# Patient Record
Sex: FEMALE | Race: ASIAN | NOT HISPANIC OR LATINO | URBAN - METROPOLITAN AREA
[De-identification: names, ages, dates, MRNs, and addresses within clinical notes are randomized per-mention and may not be internally consistent; named-entity substitution may affect disease eponyms.]

---

## 2020-08-28 ENCOUNTER — EMERGENCY (EMERGENCY)
Facility: HOSPITAL | Age: 60
LOS: 1 days | Discharge: HOME | End: 2020-08-28
Attending: EMERGENCY MEDICINE
Payer: MEDICAID

## 2020-08-28 VITALS
HEIGHT: 61.42 IN | HEART RATE: 72 BPM | RESPIRATION RATE: 18 BRPM | OXYGEN SATURATION: 97 % | SYSTOLIC BLOOD PRESSURE: 127 MMHG | WEIGHT: 143.3 LBS | TEMPERATURE: 98 F | DIASTOLIC BLOOD PRESSURE: 87 MMHG

## 2020-08-28 VITALS
SYSTOLIC BLOOD PRESSURE: 122 MMHG | TEMPERATURE: 97 F | DIASTOLIC BLOOD PRESSURE: 76 MMHG | OXYGEN SATURATION: 100 % | HEART RATE: 62 BPM | RESPIRATION RATE: 18 BRPM

## 2020-08-28 LAB
ALBUMIN SERPL ELPH-MCNC: 3.8 G/DL — SIGNIFICANT CHANGE UP (ref 3.5–5)
ALP SERPL-CCNC: 114 U/L — SIGNIFICANT CHANGE UP (ref 40–120)
ALT FLD-CCNC: 49 U/L DA — SIGNIFICANT CHANGE UP (ref 10–60)
ANION GAP SERPL CALC-SCNC: 6 MMOL/L — SIGNIFICANT CHANGE UP (ref 5–17)
APTT BLD: 33 SEC — SIGNIFICANT CHANGE UP (ref 27.5–35.5)
AST SERPL-CCNC: 29 U/L — SIGNIFICANT CHANGE UP (ref 10–40)
BASOPHILS # BLD AUTO: 0.06 K/UL — SIGNIFICANT CHANGE UP (ref 0–0.2)
BASOPHILS NFR BLD AUTO: 0.9 % — SIGNIFICANT CHANGE UP (ref 0–2)
BILIRUB SERPL-MCNC: 0.4 MG/DL — SIGNIFICANT CHANGE UP (ref 0.2–1.2)
BUN SERPL-MCNC: 16 MG/DL — SIGNIFICANT CHANGE UP (ref 7–18)
CALCIUM SERPL-MCNC: 9.4 MG/DL — SIGNIFICANT CHANGE UP (ref 8.4–10.5)
CHLORIDE SERPL-SCNC: 107 MMOL/L — SIGNIFICANT CHANGE UP (ref 96–108)
CO2 SERPL-SCNC: 26 MMOL/L — SIGNIFICANT CHANGE UP (ref 22–31)
CREAT SERPL-MCNC: 0.79 MG/DL — SIGNIFICANT CHANGE UP (ref 0.5–1.3)
EOSINOPHIL # BLD AUTO: 0.21 K/UL — SIGNIFICANT CHANGE UP (ref 0–0.5)
EOSINOPHIL NFR BLD AUTO: 3.1 % — SIGNIFICANT CHANGE UP (ref 0–6)
GLUCOSE SERPL-MCNC: 200 MG/DL — HIGH (ref 70–99)
HCT VFR BLD CALC: 46.5 % — HIGH (ref 34.5–45)
HGB BLD-MCNC: 15 G/DL — SIGNIFICANT CHANGE UP (ref 11.5–15.5)
IMM GRANULOCYTES NFR BLD AUTO: 0.6 % — SIGNIFICANT CHANGE UP (ref 0–1.5)
INR BLD: 0.96 RATIO — SIGNIFICANT CHANGE UP (ref 0.88–1.16)
LYMPHOCYTES # BLD AUTO: 2.07 K/UL — SIGNIFICANT CHANGE UP (ref 1–3.3)
LYMPHOCYTES # BLD AUTO: 30.6 % — SIGNIFICANT CHANGE UP (ref 13–44)
MCHC RBC-ENTMCNC: 27.9 PG — SIGNIFICANT CHANGE UP (ref 27–34)
MCHC RBC-ENTMCNC: 32.3 GM/DL — SIGNIFICANT CHANGE UP (ref 32–36)
MCV RBC AUTO: 86.4 FL — SIGNIFICANT CHANGE UP (ref 80–100)
MONOCYTES # BLD AUTO: 0.49 K/UL — SIGNIFICANT CHANGE UP (ref 0–0.9)
MONOCYTES NFR BLD AUTO: 7.2 % — SIGNIFICANT CHANGE UP (ref 2–14)
NEUTROPHILS # BLD AUTO: 3.89 K/UL — SIGNIFICANT CHANGE UP (ref 1.8–7.4)
NEUTROPHILS NFR BLD AUTO: 57.6 % — SIGNIFICANT CHANGE UP (ref 43–77)
NRBC # BLD: 0 /100 WBCS — SIGNIFICANT CHANGE UP (ref 0–0)
NT-PROBNP SERPL-SCNC: 34 PG/ML — SIGNIFICANT CHANGE UP (ref 0–125)
PLATELET # BLD AUTO: 275 K/UL — SIGNIFICANT CHANGE UP (ref 150–400)
POTASSIUM SERPL-MCNC: 3.7 MMOL/L — SIGNIFICANT CHANGE UP (ref 3.5–5.3)
POTASSIUM SERPL-SCNC: 3.7 MMOL/L — SIGNIFICANT CHANGE UP (ref 3.5–5.3)
PROT SERPL-MCNC: 8.3 G/DL — SIGNIFICANT CHANGE UP (ref 6–8.3)
PROTHROM AB SERPL-ACNC: 11.3 SEC — SIGNIFICANT CHANGE UP (ref 10.6–13.6)
RBC # BLD: 5.38 M/UL — HIGH (ref 3.8–5.2)
RBC # FLD: 12.7 % — SIGNIFICANT CHANGE UP (ref 10.3–14.5)
SARS-COV-2 RNA SPEC QL NAA+PROBE: SIGNIFICANT CHANGE UP
SODIUM SERPL-SCNC: 139 MMOL/L — SIGNIFICANT CHANGE UP (ref 135–145)
TROPONIN I SERPL-MCNC: <0.015 NG/ML — SIGNIFICANT CHANGE UP (ref 0–0.04)
TROPONIN I SERPL-MCNC: <0.015 NG/ML — SIGNIFICANT CHANGE UP (ref 0–0.04)
WBC # BLD: 6.76 K/UL — SIGNIFICANT CHANGE UP (ref 3.8–10.5)
WBC # FLD AUTO: 6.76 K/UL — SIGNIFICANT CHANGE UP (ref 3.8–10.5)

## 2020-08-28 PROCEDURE — 86900 BLOOD TYPING SEROLOGIC ABO: CPT

## 2020-08-28 PROCEDURE — 85610 PROTHROMBIN TIME: CPT

## 2020-08-28 PROCEDURE — 86850 RBC ANTIBODY SCREEN: CPT

## 2020-08-28 PROCEDURE — 71046 X-RAY EXAM CHEST 2 VIEWS: CPT

## 2020-08-28 PROCEDURE — 85027 COMPLETE CBC AUTOMATED: CPT

## 2020-08-28 PROCEDURE — 93005 ELECTROCARDIOGRAM TRACING: CPT

## 2020-08-28 PROCEDURE — 99284 EMERGENCY DEPT VISIT MOD MDM: CPT | Mod: 25

## 2020-08-28 PROCEDURE — U0003: CPT

## 2020-08-28 PROCEDURE — 36415 COLL VENOUS BLD VENIPUNCTURE: CPT

## 2020-08-28 PROCEDURE — 80053 COMPREHEN METABOLIC PANEL: CPT

## 2020-08-28 PROCEDURE — 86901 BLOOD TYPING SEROLOGIC RH(D): CPT

## 2020-08-28 PROCEDURE — 83880 ASSAY OF NATRIURETIC PEPTIDE: CPT

## 2020-08-28 PROCEDURE — 85730 THROMBOPLASTIN TIME PARTIAL: CPT

## 2020-08-28 PROCEDURE — 71046 X-RAY EXAM CHEST 2 VIEWS: CPT | Mod: 26

## 2020-08-28 PROCEDURE — 96372 THER/PROPH/DIAG INJ SC/IM: CPT

## 2020-08-28 PROCEDURE — 84484 ASSAY OF TROPONIN QUANT: CPT

## 2020-08-28 PROCEDURE — 99285 EMERGENCY DEPT VISIT HI MDM: CPT | Mod: 25

## 2020-08-28 RX ORDER — KETOROLAC TROMETHAMINE 30 MG/ML
30 SYRINGE (ML) INJECTION ONCE
Refills: 0 | Status: DISCONTINUED | OUTPATIENT
Start: 2020-08-28 | End: 2020-08-28

## 2020-08-28 RX ORDER — NITROGLYCERIN 6.5 MG
0.4 CAPSULE, EXTENDED RELEASE ORAL
Refills: 0 | Status: DISCONTINUED | OUTPATIENT
Start: 2020-08-28 | End: 2020-09-01

## 2020-08-28 RX ORDER — ASPIRIN/CALCIUM CARB/MAGNESIUM 324 MG
162 TABLET ORAL DAILY
Refills: 0 | Status: DISCONTINUED | OUTPATIENT
Start: 2020-08-28 | End: 2020-09-01

## 2020-08-28 RX ADMIN — Medication 0.4 MILLIGRAM(S): at 10:25

## 2020-08-28 RX ADMIN — Medication 162 MILLIGRAM(S): at 10:23

## 2020-08-28 RX ADMIN — Medication 0.4 MILLIGRAM(S): at 15:01

## 2020-08-28 RX ADMIN — Medication 30 MILLIGRAM(S): at 15:32

## 2020-08-28 RX ADMIN — Medication 30 MILLIGRAM(S): at 15:40

## 2020-08-28 NOTE — ED PROVIDER NOTE - OBJECTIVE STATEMENT
61 y/o F patient with a PMHx of HTN, DM, presents to the ED for intermittent chest pain x3 months worsening on 8/26 and today. Patient reports chest pain lasted about 30mins. States associated shortness of breath worse with exertion, diaphoresis, lightheadedness, fatigue and sweating. Denies fever, chills, cough, orthopnea or any other complaints. NKDA.

## 2020-08-28 NOTE — ED PROVIDER NOTE - PATIENT PORTAL LINK FT
You can access the FollowMyHealth Patient Portal offered by [facility] by registering at the following website: [email or web address]. By joining [facility]’s FollowMyHealth portal, you will also be able to view your health information using other applications (apps) compatible with our system.

## 2020-08-28 NOTE — ED ADULT NURSE NOTE - OBJECTIVE STATEMENT
Pt arrived to ED c/o chest pain on and off x 3 months, accompanied by shortness of breath  Appears comfortable, states pain is 4/10

## 2020-08-28 NOTE — ED ADULT NURSE NOTE - CHPI ED NUR SYMPTOMS NEG
no syncope/no nausea/no dizziness/no fever/no chills/no shortness of breath/no vomiting/no congestion/no diaphoresis/no back pain

## 2022-09-15 ENCOUNTER — INPATIENT (INPATIENT)
Facility: HOSPITAL | Age: 62
LOS: 1 days | Discharge: HOME | DRG: 247 | End: 2022-09-17
Attending: INTERNAL MEDICINE | Admitting: INTERNAL MEDICINE
Payer: MEDICAID

## 2022-09-15 ENCOUNTER — EMERGENCY (EMERGENCY)
Facility: HOSPITAL | Age: 62
LOS: 1 days | Discharge: ANOTHER HOSPITAL | End: 2022-09-15
Admitting: EMERGENCY MEDICINE
Payer: MEDICAID

## 2022-09-15 VITALS
HEIGHT: 61 IN | SYSTOLIC BLOOD PRESSURE: 132 MMHG | DIASTOLIC BLOOD PRESSURE: 86 MMHG | TEMPERATURE: 98 F | OXYGEN SATURATION: 99 % | WEIGHT: 205.25 LBS | HEART RATE: 85 BPM | RESPIRATION RATE: 18 BRPM

## 2022-09-15 DIAGNOSIS — I21.3 ST ELEVATION (STEMI) MYOCARDIAL INFARCTION OF UNSPECIFIED SITE: ICD-10-CM

## 2022-09-15 DIAGNOSIS — Z90.49 ACQUIRED ABSENCE OF OTHER SPECIFIED PARTS OF DIGESTIVE TRACT: Chronic | ICD-10-CM

## 2022-09-15 PROBLEM — E11.9 TYPE 2 DIABETES MELLITUS WITHOUT COMPLICATIONS: Chronic | Status: ACTIVE | Noted: 2020-08-28

## 2022-09-15 PROBLEM — I10 ESSENTIAL (PRIMARY) HYPERTENSION: Chronic | Status: ACTIVE | Noted: 2020-08-28

## 2022-09-15 LAB
ALBUMIN SERPL ELPH-MCNC: 4.6 G/DL — SIGNIFICANT CHANGE UP (ref 3.3–5)
ALP SERPL-CCNC: 71 U/L — SIGNIFICANT CHANGE UP (ref 40–120)
ALT FLD-CCNC: 22 U/L — SIGNIFICANT CHANGE UP (ref 10–45)
ANION GAP SERPL CALC-SCNC: 11 MMOL/L — SIGNIFICANT CHANGE UP (ref 5–17)
APTT BLD: >200 SEC — CRITICAL HIGH (ref 27.5–35.5)
AST SERPL-CCNC: 71 U/L — HIGH (ref 10–40)
BASOPHILS # BLD AUTO: 0.05 K/UL — SIGNIFICANT CHANGE UP (ref 0–0.2)
BASOPHILS NFR BLD AUTO: 0.5 % — SIGNIFICANT CHANGE UP (ref 0–2)
BILIRUB SERPL-MCNC: 0.6 MG/DL — SIGNIFICANT CHANGE UP (ref 0.2–1.2)
BLD GP AB SCN SERPL QL: NEGATIVE — SIGNIFICANT CHANGE UP
BUN SERPL-MCNC: 6 MG/DL — LOW (ref 7–23)
CALCIUM SERPL-MCNC: 9.4 MG/DL — SIGNIFICANT CHANGE UP (ref 8.4–10.5)
CHLORIDE SERPL-SCNC: 105 MMOL/L — SIGNIFICANT CHANGE UP (ref 96–108)
CK MB BLD-MCNC: 10.9 % — HIGH (ref 0–3.5)
CK MB CFR SERPL CALC: 80.5 NG/ML — HIGH (ref 0–3.8)
CK SERPL-CCNC: 740 U/L — HIGH (ref 25–170)
CO2 SERPL-SCNC: 22 MMOL/L — SIGNIFICANT CHANGE UP (ref 22–31)
CREAT SERPL-MCNC: 0.44 MG/DL — LOW (ref 0.5–1.3)
EGFR: 111 ML/MIN/1.73M2 — SIGNIFICANT CHANGE UP
EOSINOPHIL # BLD AUTO: 0.11 K/UL — SIGNIFICANT CHANGE UP (ref 0–0.5)
EOSINOPHIL NFR BLD AUTO: 1.1 % — SIGNIFICANT CHANGE UP (ref 0–6)
GLUCOSE SERPL-MCNC: 160 MG/DL — HIGH (ref 70–99)
HCT VFR BLD CALC: 43.3 % — SIGNIFICANT CHANGE UP (ref 34.5–45)
HGB BLD-MCNC: 14.2 G/DL — SIGNIFICANT CHANGE UP (ref 11.5–15.5)
IMM GRANULOCYTES NFR BLD AUTO: 0.7 % — SIGNIFICANT CHANGE UP (ref 0–0.9)
INR BLD: 1.07 RATIO — SIGNIFICANT CHANGE UP (ref 0.88–1.16)
LACTATE SERPL-SCNC: 1.7 MMOL/L — SIGNIFICANT CHANGE UP (ref 0.5–2)
LYMPHOCYTES # BLD AUTO: 1.79 K/UL — SIGNIFICANT CHANGE UP (ref 1–3.3)
LYMPHOCYTES # BLD AUTO: 17.5 % — SIGNIFICANT CHANGE UP (ref 13–44)
MAGNESIUM SERPL-MCNC: 1.9 MG/DL — SIGNIFICANT CHANGE UP (ref 1.6–2.6)
MCHC RBC-ENTMCNC: 27.8 PG — SIGNIFICANT CHANGE UP (ref 27–34)
MCHC RBC-ENTMCNC: 32.8 GM/DL — SIGNIFICANT CHANGE UP (ref 32–36)
MCV RBC AUTO: 84.9 FL — SIGNIFICANT CHANGE UP (ref 80–100)
MONOCYTES # BLD AUTO: 0.54 K/UL — SIGNIFICANT CHANGE UP (ref 0–0.9)
MONOCYTES NFR BLD AUTO: 5.3 % — SIGNIFICANT CHANGE UP (ref 2–14)
NEUTROPHILS # BLD AUTO: 7.64 K/UL — HIGH (ref 1.8–7.4)
NEUTROPHILS NFR BLD AUTO: 74.9 % — SIGNIFICANT CHANGE UP (ref 43–77)
NRBC # BLD: 0 /100 WBCS — SIGNIFICANT CHANGE UP (ref 0–0)
NT-PROBNP SERPL-SCNC: 104 PG/ML — SIGNIFICANT CHANGE UP (ref 0–300)
PHOSPHATE SERPL-MCNC: 2.4 MG/DL — LOW (ref 2.5–4.5)
PLATELET # BLD AUTO: 278 K/UL — SIGNIFICANT CHANGE UP (ref 150–400)
POTASSIUM SERPL-MCNC: 3.8 MMOL/L — SIGNIFICANT CHANGE UP (ref 3.5–5.3)
POTASSIUM SERPL-SCNC: 3.8 MMOL/L — SIGNIFICANT CHANGE UP (ref 3.5–5.3)
PROT SERPL-MCNC: 7.7 G/DL — SIGNIFICANT CHANGE UP (ref 6–8.3)
PROTHROM AB SERPL-ACNC: 12.4 SEC — SIGNIFICANT CHANGE UP (ref 10.5–13.4)
RBC # BLD: 5.1 M/UL — SIGNIFICANT CHANGE UP (ref 3.8–5.2)
RBC # FLD: 12.4 % — SIGNIFICANT CHANGE UP (ref 10.3–14.5)
RH IG SCN BLD-IMP: POSITIVE — SIGNIFICANT CHANGE UP
SODIUM SERPL-SCNC: 138 MMOL/L — SIGNIFICANT CHANGE UP (ref 135–145)
TROPONIN T, HIGH SENSITIVITY RESULT: 1103 NG/L — HIGH (ref 0–51)
WBC # BLD: 10.2 K/UL — SIGNIFICANT CHANGE UP (ref 3.8–10.5)
WBC # FLD AUTO: 10.2 K/UL — SIGNIFICANT CHANGE UP (ref 3.8–10.5)

## 2022-09-15 PROCEDURE — 99152 MOD SED SAME PHYS/QHP 5/>YRS: CPT

## 2022-09-15 PROCEDURE — 93458 L HRT ARTERY/VENTRICLE ANGIO: CPT | Mod: 26,59

## 2022-09-15 PROCEDURE — ZZZZZ: CPT

## 2022-09-15 PROCEDURE — 92941 PRQ TRLML REVSC TOT OCCL AMI: CPT | Mod: LD

## 2022-09-15 PROCEDURE — 99291 CRITICAL CARE FIRST HOUR: CPT | Mod: GC,25

## 2022-09-15 RX ORDER — INFLUENZA VIRUS VACCINE 15; 15; 15; 15 UG/.5ML; UG/.5ML; UG/.5ML; UG/.5ML
0.5 SUSPENSION INTRAMUSCULAR ONCE
Refills: 0 | Status: DISCONTINUED | OUTPATIENT
Start: 2022-09-15 | End: 2022-09-17

## 2022-09-15 RX ORDER — DEXTROSE 50 % IN WATER 50 %
12.5 SYRINGE (ML) INTRAVENOUS ONCE
Refills: 0 | Status: DISCONTINUED | OUTPATIENT
Start: 2022-09-15 | End: 2022-09-17

## 2022-09-15 RX ORDER — POTASSIUM CHLORIDE 20 MEQ
20 PACKET (EA) ORAL ONCE
Refills: 0 | Status: COMPLETED | OUTPATIENT
Start: 2022-09-15 | End: 2022-09-15

## 2022-09-15 RX ORDER — GLUCAGON INJECTION, SOLUTION 0.5 MG/.1ML
1 INJECTION, SOLUTION SUBCUTANEOUS ONCE
Refills: 0 | Status: DISCONTINUED | OUTPATIENT
Start: 2022-09-15 | End: 2022-09-17

## 2022-09-15 RX ORDER — TICAGRELOR 90 MG/1
90 TABLET ORAL EVERY 12 HOURS
Refills: 0 | Status: DISCONTINUED | OUTPATIENT
Start: 2022-09-16 | End: 2022-09-17

## 2022-09-15 RX ORDER — INSULIN LISPRO 100/ML
VIAL (ML) SUBCUTANEOUS
Refills: 0 | Status: DISCONTINUED | OUTPATIENT
Start: 2022-09-15 | End: 2022-09-17

## 2022-09-15 RX ORDER — ASPIRIN/CALCIUM CARB/MAGNESIUM 324 MG
81 TABLET ORAL DAILY
Refills: 0 | Status: DISCONTINUED | OUTPATIENT
Start: 2022-09-16 | End: 2022-09-17

## 2022-09-15 RX ORDER — DEXTROSE 50 % IN WATER 50 %
25 SYRINGE (ML) INTRAVENOUS ONCE
Refills: 0 | Status: DISCONTINUED | OUTPATIENT
Start: 2022-09-15 | End: 2022-09-17

## 2022-09-15 RX ORDER — HEPARIN SODIUM 5000 [USP'U]/ML
5000 INJECTION INTRAVENOUS; SUBCUTANEOUS EVERY 12 HOURS
Refills: 0 | Status: DISCONTINUED | OUTPATIENT
Start: 2022-09-15 | End: 2022-09-17

## 2022-09-15 RX ORDER — SODIUM CHLORIDE 9 MG/ML
1000 INJECTION, SOLUTION INTRAVENOUS
Refills: 0 | Status: DISCONTINUED | OUTPATIENT
Start: 2022-09-15 | End: 2022-09-17

## 2022-09-15 RX ORDER — ATORVASTATIN CALCIUM 80 MG/1
80 TABLET, FILM COATED ORAL AT BEDTIME
Refills: 0 | Status: DISCONTINUED | OUTPATIENT
Start: 2022-09-15 | End: 2022-09-17

## 2022-09-15 RX ORDER — CHLORHEXIDINE GLUCONATE 213 G/1000ML
1 SOLUTION TOPICAL DAILY
Refills: 0 | Status: DISCONTINUED | OUTPATIENT
Start: 2022-09-15 | End: 2022-09-17

## 2022-09-15 RX ORDER — DEXTROSE 50 % IN WATER 50 %
15 SYRINGE (ML) INTRAVENOUS ONCE
Refills: 0 | Status: DISCONTINUED | OUTPATIENT
Start: 2022-09-15 | End: 2022-09-17

## 2022-09-15 RX ORDER — MAGNESIUM SULFATE 500 MG/ML
2 VIAL (ML) INJECTION ONCE
Refills: 0 | Status: COMPLETED | OUTPATIENT
Start: 2022-09-15 | End: 2022-09-15

## 2022-09-15 RX ORDER — METOPROLOL TARTRATE 50 MG
25 TABLET ORAL
Refills: 0 | Status: DISCONTINUED | OUTPATIENT
Start: 2022-09-15 | End: 2022-09-17

## 2022-09-15 RX ADMIN — Medication 20 MILLIEQUIVALENT(S): at 22:29

## 2022-09-15 RX ADMIN — ATORVASTATIN CALCIUM 80 MILLIGRAM(S): 80 TABLET, FILM COATED ORAL at 21:15

## 2022-09-15 RX ADMIN — Medication 25 GRAM(S): at 22:29

## 2022-09-15 RX ADMIN — Medication 25 MILLIGRAM(S): at 22:29

## 2022-09-15 NOTE — PATIENT PROFILE ADULT - CAREGIVER RELATION TO PATIENT
Medicare Annual Wellness Visit  Chief Complaint   Patient presents with   • Medicare Wellness-subsequent   • Hypertension   • Heartburn   • Overactive Bladder   • Hypothyroidism   • Constipation       Subjective     Anila Spencer is a 84 y.o. female who presents for an Annual Wellness Visit. In addition, we addressed the following health issues:    Patient is following up on multiple chronic medical conditions, including hypertension, hypothyroidism, prediabetes, GERD, constipation, and anxiety.  See review of systems below for more details.      Medications    Current Outpatient Medications:   •  Calcium Carbonate-Vitamin D3 (CALCIUM 600-D) 600-400 MG-UNIT tablet, Take 1 tablet by mouth 2 (Two) Times a Day., Disp: , Rfl:   •  Dexilant 60 MG capsule, TAKE 1 CAPSULE BY MOUTH EVERY DAY, Disp: 90 capsule, Rfl: 0  •  escitalopram (LEXAPRO) 5 MG tablet, Take 5 mg by mouth Daily., Disp: , Rfl:   •  fesoterodine fumarate (TOVIAZ ER) 8 MG tablet sustained-release 24 hour tablet, Take 8 mg by mouth Every Evening., Disp: , Rfl:   •  levothyroxine (SYNTHROID, LEVOTHROID) 50 MCG tablet, TAKE 1 TABLET BY MOUTH EVERY DAY, Disp: 90 tablet, Rfl: 0  •  losartan (COZAAR) 25 MG tablet, TAKE 1 TABLET BY MOUTH EVERY DAY FOR BLOOD PRESSURE, Disp: 90 tablet, Rfl: 3  •  metoprolol tartrate (LOPRESSOR) 50 MG tablet, TAKE 1 TABLET BY MOUTH TWICE A DAY, Disp: 180 tablet, Rfl: 0  •  midodrine (PROAMATINE) 5 MG tablet, TAKE 1 TABLET BY MOUTH EVERY DAY, Disp: 90 tablet, Rfl: 1  •  multivitamin with minerals tablet tablet, Take 1 tablet by mouth Daily., Disp: , Rfl:   •  psyllium (METAMUCIL) 58.6 % packet, Take 1 packet by mouth Daily., Disp: , Rfl:   •  zonisamide (ZONEGRAN) 100 MG capsule, Take 300 mg by mouth Every Evening. Take three 100 mg capsules may take four if needed, Disp: , Rfl:      Problem List  Patient Active Problem List   Diagnosis   • Cardiomegaly   • Essential hypertension   • Mitral valve insufficiency   • Presence of  cardiac pacemaker   • DDD (degenerative disc disease), lumbar   • Sick sinus syndrome (CMS/HCC)   • PCO (posterior capsular opacification), bilateral   • Diplopia   • Myopia   • Glaucoma suspect   • Presbyopia   • Pseudophakia of both eyes   • Prediabetes   • Hypothyroidism   • Urinary incontinence   • Squamous cell skin cancer, face   • Spinal stenosis of cervical region   • Seizure disorder (CMS/HCC)   • Rotator cuff tear   • Osteoporosis   • Osteoarthritis   • Kidney stone   • GERD (gastroesophageal reflux disease)   • Fracture of multiple ribs   • Emphysema of lung (CMS/HCC)   • Fatty liver   • Degeneration of intervertebral disc of thoracic region   • DDD (degenerative disc disease), cervical   • Chronic constipation   • Cervical spinal stenosis   • Biliary dyskinesia   • Basal cell carcinoma (BCC) of face   • Anxiety   • Traumatic brain injury (CMS/HCC)   • Nasal fracture   • Chronic insomnia   • Allergic rhinitis   • Class 1 obesity due to excess calories with serious comorbidity and body mass index (BMI) of 32.0 to 32.9 in adult   • Left arm pain   • Glaucoma suspect, left   • Elective replacement indicated for cardiac pacemaker battery at end of lifespan   • Overactive bladder   • Orthostatic hypotension       Surgical History  Past Surgical History:   Procedure Laterality Date   • BILATERAL SALPINGO OOPHORECTOMY      for benign cysts   • BREAST CYST ASPIRATION Left    • CARDIAC CATHETERIZATION  08/25/2009    25% LAD. L Cx luminal irregularities. Normal L main   • CARDIAC CATHETERIZATION  03/29/2017    25% LAD. 10-20% LCX. ER 65%. Dr. Agrawal.    • CARDIAC ELECTROPHYSIOLOGY PROCEDURE N/A 6/3/2021    Procedure: PPM generator change - dual    MDT;  Surgeon: Bharathi Mora MD;  Location: [facility] INVASIVE LOCATION;  Service: Cardiovascular;  Laterality: N/A;   • CARDIOVASCULAR STRESS TEST  2019   • CATARACT EXTRACTION  2006   • COSMETIC SURGERY      Lip   • ECHO - CONVERTED  2019   • ENDOSCOPY  05/17/2017     [place], Dr. Gutierrez   • ENDOSCOPY N/A 8/22/2019    Procedure: ESOPHAGOGASTRODUODENOSCOPY WITH DILATATION (BOUGIE #46,[id number]);  Surgeon: Joaquin Story MD;  Location: [facility] ENDOSCOPY;  Service: Gastroenterology   • INSERT / REPLACE / REMOVE PACEMAKER     • KNEE ARTHROSCOPY Left 1992   • OOPHORECTOMY     • OTHER SURGICAL HISTORY Right 07/2017    Right eye, YAG Capsuloyomy , Dr. Lemus   • PACEMAKER IMPLANTATION  2009   • ROTATOR CUFF REPAIR Right     Dr. Goldberg   • TONSILLECTOMY     • TOTAL HIP ARTHROPLASTY Bilateral         Social History  Social History     Socioeconomic History   • Marital status:      Spouse name: Not on file   • Number of children: Not on file   • Years of education: Not on file   • Highest education level: Not on file   Tobacco Use   • Smoking status: Never Smoker   • Smokeless tobacco: Never Used   • Tobacco comment: Patient is advised not to smoke.   Vaping Use   • Vaping Use: Never used   Substance and Sexual Activity   • Alcohol use: No   • Drug use: No   • Sexual activity: Defer        Family History  Family History   Problem Relation Age of Onset   • Heart disease Mother    • Pancreatic cancer Mother    • Prostate cancer Father    • Breast cancer Sister         4 sisters have had   • Pancreatic cancer Brother    • Colon cancer Brother    • Stroke Maternal Grandmother         Health Risk Assessment:  The patient has completed a Health Risk Assessment and it has been reviewed.    Current Medical Providers:  Patient Care Team:  Helena Mas APRN as PCP - Shannon Vitale DO as Consulting Physician (Neurology)  Adam Agrawal MD as Consulting Physician (Cardiology)  León Berg MD (Orthopedic Surgery)  Joaquin Story MD as Consulting Physician (Gastroenterology)  Banet, Duane Edward, MD as Consulting Physician (Dermatology)  Bart Longo MD as Consulting Physician (Urology)    Age-appropriate Screening Schedule:  Refer to the list  below for future screening recommendations based on patient's age. Orders for these recommended tests are listed in the plan section. The patient has been provided with a written plan.    Health Maintenance   Topic Date Due   • DXA SCAN  05/03/2021   • ANNUAL WELLNESS VISIT  07/16/2021   • INFLUENZA VACCINE  10/01/2021   • TDAP/TD VACCINES (3 - Td or Tdap) 04/10/2028   • COVID-19 Vaccine  Completed   • Pneumococcal Vaccine 65+  Completed   • ZOSTER VACCINE  Completed       Depression Screen:   PHQ-2/PHQ-9 Depression Screening 7/20/2021   Little interest or pleasure in doing things 0   Feeling down, depressed, or hopeless 0   Trouble falling or staying asleep, or sleeping too much 0   Feeling tired or having little energy 0   Poor appetite or overeating 0   Feeling bad about yourself - or that you are a failure or have let yourself or your family down 0   Trouble concentrating on things, such as reading the newspaper or watching television 0   Moving or speaking so slowly that other people could have noticed. Or the opposite - being so fidgety or restless that you have been moving around a lot more than usual 0   Thoughts that you would be better off dead, or of hurting yourself in some way 0   Total Score 0   If you checked off any problems, how difficult have these problems made it for you to do your work, take care of things at home, or get along with other people? Not difficult at all       Functional and Cognitive Screening:  Functional & Cognitive Status 7/20/2021   Do you have difficulty preparing food and eating? No   Do you have difficulty bathing yourself, getting dressed or grooming yourself? Yes   Do you have difficulty using the toilet? Yes   Do you have difficulty moving around from place to place? Yes   Do you have trouble with steps or getting out of a bed or a chair? Yes   Current Diet Well Balanced Diet   Dental Exam Up to date   Eye Exam Up to date   Exercise (times per week) 0 times per week    Current Exercises Include No Regular Exercise   Current Exercise Activities Include -   Do you need help using the phone?  No   Are you deaf or do you have serious difficulty hearing?  No   Do you need help with transportation? No   Do you need help shopping? No   Do you need help preparing meals?  No   Do you need help with housework?  No   Do you need help with laundry? No   Do you need help taking your medications? No   Do you need help managing money? No   Do you ever drive or ride in a car without wearing a seat belt? No   Have you felt unusual stress, anger or loneliness in the last month? No   Who do you live with? Spouse   If you need help, do you have trouble finding someone available to you? No   Have you been bothered in the last four weeks by sexual problems? -   Do you have difficulty concentrating, remembering or making decisions? No       Does the patient have evidence of cognitive impairment? No    ATTENTION  What is the year: correct (21 1000)  What is the month of the year: correct (21 1000)  What is the day of the week?: correct (21 1000)  What is the date?: correct (21 1000)  MEMORY  Repeat address three times, only score third attempt: [address], [place]: 3 (21 1000)  HOW MANY ANIMALS DID THE PATIENT NAME  Verbal Fluency -- Animal Names (0-25): 22+ (21 1000)  CLOCK DRAWING  Clock Drawing: All Correct (21 1000)  MEMORY RECALL  Tell me what you remember about that name and address we were repeating at the beginnin (21 1000)  ACE TOTAL SCORE  Total ACE Score - <25/30 strongly suggests cognitive impairment; <21/30 almost certainly shows dementia: 23 (21 1000)    Advanced Care Planning:  ACP discussion was held with the patient during this visit. Patient has an advance directive in EMR which is still valid.     Identification of Risk Factors:  Risk factors include: Breast Cancer/Mammogram  Screening  Cardiovascular risk  Obesity/Overweight   Osteoporosis Risk  Urinary Incontinence.    Review of Systems   Constitutional: Positive for fatigue. Negative for appetite change, chills, diaphoresis, fever, unexpected weight gain and unexpected weight loss.   HENT: Negative for congestion, ear discharge, ear pain, hearing loss, mouth sores, nosebleeds, postnasal drip, rhinorrhea, sinus pressure, sneezing, sore throat, swollen glands and trouble swallowing.         Dry mouth   Eyes: Negative for blurred vision, pain, discharge, redness and itching.   Respiratory: Negative for cough, choking, shortness of breath, wheezing and stridor.    Cardiovascular: Negative for chest pain, palpitations and leg swelling.   Gastrointestinal: Positive for abdominal pain, constipation, diarrhea and indigestion. Negative for abdominal distention, anal bleeding, blood in stool, nausea, rectal pain and vomiting.   Endocrine: Negative for cold intolerance, heat intolerance, polydipsia, polyphagia and polyuria.   Genitourinary: Positive for urinary incontinence. Negative for breast discharge, breast lump, breast pain, difficulty urinating, dysuria, flank pain, frequency, genital sores, hematuria, pelvic pain, urgency, vaginal bleeding, vaginal discharge and vaginal pain.   Musculoskeletal: Positive for arthralgias, back pain, gait problem and neck pain. Negative for joint swelling, myalgias and neck stiffness.   Skin: Negative for color change, rash and skin lesions.   Neurological: Negative for dizziness, tremors, seizures, syncope, speech difficulty, weakness, light-headedness, numbness, headache, memory problem and confusion.   Hematological: Negative for adenopathy. Does not bruise/bleed easily.   Psychiatric/Behavioral: Negative for self-injury, sleep disturbance, suicidal ideas, depressed mood and stress. The patient is not nervous/anxious.      Objective     Vitals:    07/20/21 1009 07/20/21 1042   BP: 145/72 118/62   BP  "Location: Right arm    Patient Position: Sitting    Cuff Size: Large Adult    Pulse: 60    Resp: 16    Temp: 97.4 °F (36.3 °C)    TempSrc: Infrared    SpO2: 97%    Weight: 84.1 kg (185 lb 8 oz)    Height: 160 cm (63\")          07/20/21  1009   Weight: 84.1 kg (185 lb 8 oz)     Body mass index is 32.86 kg/m².    Physical Exam  Vitals reviewed.   Constitutional:       General: She is not in acute distress.     Appearance: She is well-developed.   HENT:      Head: Normocephalic and atraumatic.      Right Ear: Tympanic membrane, ear canal and external ear normal. Tympanic membrane is not injected, erythematous, retracted or bulging.      Left Ear: Tympanic membrane, ear canal and external ear normal. Tympanic membrane is not injected, erythematous, retracted or bulging.      Nose: Nose normal. No mucosal edema or rhinorrhea.      Right Sinus: No maxillary sinus tenderness or frontal sinus tenderness.      Left Sinus: No maxillary sinus tenderness or frontal sinus tenderness.      Mouth/Throat:      Mouth: No oral lesions.      Pharynx: Uvula midline. No oropharyngeal exudate or posterior oropharyngeal erythema.   Eyes:      General: Lids are normal.         Right eye: No discharge.         Left eye: No discharge.      Conjunctiva/sclera: Conjunctivae normal.      Pupils: Pupils are equal, round, and reactive to light.   Neck:      Thyroid: No thyroid mass or thyromegaly.      Vascular: No carotid bruit or JVD.      Trachea: No tracheal deviation.   Cardiovascular:      Rate and Rhythm: Normal rate and regular rhythm.      Heart sounds: Murmur heard.   Systolic murmur is present with a grade of 1/6.   No friction rub. No gallop.    Pulmonary:      Effort: Pulmonary effort is normal. No respiratory distress.      Breath sounds: Normal breath sounds. No wheezing or rales.   Chest:      Breasts: Breasts are symmetrical.         Right: No inverted nipple, mass, nipple discharge, skin change or tenderness.         Left: No " inverted nipple, mass, nipple discharge, skin change or tenderness.   Abdominal:      General: Bowel sounds are normal. There is no distension.      Palpations: Abdomen is soft. There is no mass.      Tenderness: There is no abdominal tenderness.      Hernia: No hernia is present.   Genitourinary:     Comments: Patient declined examination  Musculoskeletal:         General: No deformity.      Cervical back: Neck supple.   Lymphadenopathy:      Cervical: No cervical adenopathy.   Skin:     General: Skin is warm and dry.      Findings: No lesion or rash.   Neurological:      Mental Status: She is alert and oriented to person, place, and time.      Cranial Nerves: No cranial nerve deficit.      Sensory: No sensory deficit.      Gait: Gait abnormal (using cane).      Deep Tendon Reflexes: Reflexes are normal and symmetric.   Psychiatric:         Speech: Speech normal.         Behavior: Behavior normal.         Thought Content: Thought content normal.         Judgment: Judgment normal.       RECORDS REVIEWED TODAY:  6/15/21 Cardiology Note - Pacemaker Check  6/8/21 GI Note  6/4/21 Discharge Summary - Pacemaker battery change  6/3/21 ER Note  6/3/21 Chest X-ray  5/4/21 CT abd/pelvis  4/20/21 KUB X-ray    Lab Results   Component Value Date    WBC 8.10 06/04/2021    HGB 11.5 (L) 06/04/2021    HCT 34.2 06/04/2021    MCV 96.7 06/04/2021     06/04/2021     Lab Results   Component Value Date    GLUCOSE 89 06/04/2021    BUN 22 06/04/2021    CREATININE 1.09 (H) 06/04/2021    EGFRIFNONA 48 (L) 06/04/2021    EGFRIFAFRI 68 11/05/2019    BCR 20.2 06/04/2021    K 4.1 06/04/2021    CO2 21.0 (L) 06/04/2021    CALCIUM 8.2 (L) 06/04/2021    PROTENTOTREF 6.5 11/05/2019    ALBUMIN 4.1 11/05/2019    LABIL2 1.7 11/05/2019    AST 15 11/05/2019    ALT 15 11/05/2019     No results found for: CHOL, CHLPL, TRIG, HDL, LDL, LDLDIRECT  Lab Results   Component Value Date    TSH 2.840 11/05/2019     Lab Results   Component Value Date    HGBA1C  5.4 11/05/2019     Assessment/Plan   Patient Self-Management and Personalized Health Advice  The patient has been provided with information about: diet, exercise, weight management and prevention of cardiac or vascular disease and preventive services including:   · Annual Wellness Visit (AWV)  · Bone Density Measurements  · Screening Mammography .    Discussed the patient's BMI with her. The BMI is above average; BMI management plan is completed.    Diagnoses and all orders for this visit:    1. Encounter for general adult medical examination with abnormal findings (Primary)  Comments:  Discussed age-appropriate health maintenance.    2. Essential hypertension  Assessment & Plan:  Stable.  Continue losartan and Lopressor.    Orders:  -     Cancel: CBC & Differential  -     Cancel: Comprehensive Metabolic Panel  -     CBC & Differential; Future  -     Comprehensive Metabolic Panel; Future    3. Prediabetes  Assessment & Plan:  Last A1c was in normal range.    Orders:  -     Cancel: Hemoglobin A1c  -     Hemoglobin A1c; Future    4. Acquired hypothyroidism    5. Gastroesophageal reflux disease, unspecified whether esophagitis present  Assessment & Plan:  She continues to have upper GI symptoms even on Dexilant.  Continue follow-up with GI.      6. Chronic constipation  Assessment & Plan:  Now alternating with loose stool.  She brings in paper with possible side effects of Dexilant.  She wonders if this could be contributing to her GI symptoms.  Advised her to continue Dexilant at this time and to discuss this further with GI at her next appointment..        7. Overactive bladder  Assessment & Plan:  She has discussed possibility of bladder stimulator placement with urology, however she does not plan to do this due to her age.  Discussed possibility of Toviaz causing dry mouth.  She will discuss this with her urologist at her next appointment.  She might be a candidate for Myrbetriq.      8. Anxiety  Assessment &  Plan:  Lexapro dose was recently decreased by her neurologist and she has been doing well.      9. Age-related osteoporosis without current pathological fracture  Assessment & Plan:  History of Fosamax treatment, but she quit this in 2012 due to fears of side effects.  Encouraged weightbearing exercise and calcium intake.  Will call with DEXA results and further recommendations.    Orders:  -     DEXA Bone Density Axial; Future    10. Primary osteoarthritis involving multiple joints  Assessment & Plan:  Previously followed by Dr. Berg.  No recent worsening.  We will continue to monitor.      11. Pulmonary emphysema, unspecified emphysema type (CMS/HCC)  Assessment & Plan:  The mild dyspnea with exertion that she was having has improved since she had her pacemaker battery replaced.        12. Anemia, unspecified type  -     Cancel: Vitamin B12  -     Vitamin B12; Future    13. Allergic rhinitis, unspecified seasonality, unspecified trigger  Assessment & Plan:  Mild, seasonal.      14. Sick sinus syndrome (CMS/HCC)  Assessment & Plan:  Status post recent pacemaker battery change.  Continue follow-up with cardiology.      15. Orthostatic hypotension  Assessment & Plan:  Followed by cardiology.  Doing well on midodrine.      16. Fatty liver  Assessment & Plan:  Normal LFTs when last checked.  Encouraged healthy diet, regular physical activity.      17. Breast cancer screening by mammogram  -     Mammo Screening Digital Tomosynthesis Bilateral With CAD; Future    18. Seizure disorder (CMS/HCC)  Assessment & Plan:  No seizure activity for several years.  Continue follow-up with neurology.        Orders:  Orders Placed This Encounter   Procedures   • Mammo Screening Digital Tomosynthesis Bilateral With CAD   • DEXA Bone Density Axial   • Comprehensive Metabolic Panel   • Hemoglobin A1c   • Vitamin B12   • CBC & Differential       Follow Up:  Return in about 6 months (around 1/20/2022) for Follow-Up hypertension.     An  After Visit Summary and PPPS with all of these plans were given to the patient.     son

## 2022-09-15 NOTE — H&P ADULT - ASSESSMENT
ASSESSMENT AND PLAN:  Admit Date:  IRIS BRAMBILA is a 62y Female with HTN, HLD, DM who presented with 1 day of chest pain/pressure found to have 99% occlusion to LAD s/p 1 TAI.    NEURO:  Baseline: AOx3, at baseline    CARDIOVASCULAR:  #STEMI S/P TIA to LAD  - EKG with ST elevations in II, III, AVF. Trop 734. Cath with 99% occlusion to LAD   - ASA, Brilinta load at [facility]. Will start ASA and brilinta, high intensity statin  - Will continue home metoprolol tartrate 25mg BID  - Echo ordered  - Trend trops  - Keep Mg>2, K>4    #HTN  - Will start home amlodipine when able    RESPIRATORY:  - No active issues, on RA.    GI/NUTRITION:  - No active issues  Diet: Carb consistent    RENAL:   - No active issues. Will monitor Cr post contrast    ID:   - No active issues.    HEME:  - Maintain active type and screen    ENDOCRINE:  #DM  - Will hold home antidiabetics and start ISS if necessary.  - Monitor finger sticks    DVT PPX:  - Heparin 5000 subq    ETHICS:  - Full code ASSESSMENT AND PLAN:  Admit Date:  IRIS BRAMBILA is a 62y Female with HTN, HLD, DM who presented with 1 day of chest pain/pressure found to have 99% occlusion to LAD s/p 1 TIA.    NEURO:  Baseline: AOx3, at baseline    CARDIOVASCULAR:  #STEMI S/P TIA to LAD  - EKG with ST elevations in II, III, AVF. Trop 734. Cath with 99% occlusion to LAD   - ASA, Brilinta load at [facility]. Will start ASA and brilinta, high intensity statin  - Will continue home metoprolol tartrate 25mg BID  - Echo ordered  - Trend trops  - F/u lipid profile  - Keep Mg>2, K>4    #HTN  - Will start home amlodipine when able    RESPIRATORY:  - No active issues, on RA.    GI/NUTRITION:  - No active issues  Diet: Carb consistent    RENAL:   - No active issues. Will monitor Cr post contrast    ID:   - No active issues.    HEME:  - Maintain active type and screen    ENDOCRINE:  #DM  - Will hold home antidiabetics and start ISS.  - F/u A1C  - Monitor finger sticks    DVT PPX:  - Heparin 5000 subq    ETHICS:  - Full code ASSESSMENT AND PLAN:  Admit Date:  IRIS BRAMBILA is a 62y Female with HTN, HLD, DM who presented with 1 day of chest pain/pressure found to have 99% occlusion to LAD s/p 1 TIA.    NEURO:  Baseline: AOx3, at baseline    CARDIOVASCULAR:  #STEMI S/P TIA to LAD  - EKG with ST elevations in II, III, AVF. Trop 734. Cath with 99% occlusion to LAD   - ASA, Brilinta load at [facility]placed by [facility]. Will start ASA and brilinta, high intensity statin  - Will continue home metoprolol tartrate 25mg BID  - Echo ordered  - Trend trops  - F/u lipid profile  - Keep Mg>2, K>4    #HTN  - Will start home amlodipine when able    RESPIRATORY:  - No active issues, on RA.    GI/NUTRITION:  - No active issues  Diet: Carb consistent    RENAL:   - No active issues. Will monitor Cr post contrast    ID:   - No active issues.    HEME:  - Maintain active type and screen    ENDOCRINE:  #DM  - Will hold home antidiabetics and start ISS.  - F/u A1C  - Monitor finger sticks    DVT PPX:  - Heparin 5000 subq    ETHICS:  - Full code    Fellow addendum:   63yo with Dm, htn, hld presenting with chest pain and stemi w/anterolateral lead involvement, STD in inferior leads, found to have 99% occlusion to LAD s/p 1 TIA   Post ACS protocol with monitoring in CICU with TTE to follow ASSESSMENT AND PLAN:  Admit Date:  IRIS BRAMBILA is a 62y Female with HTN, HLD, DM who presented with 1 day of chest pain/pressure found to have 99% occlusion to LAD s/p 1 TIA.    NEURO:  Baseline: AOx3, at baseline    CARDIOVASCULAR:  #STEMI S/P TIA to LAD  - EKG with ST elevations in II, III, AVF. Trop 734. Cath with 99% occlusion to LAD   - ASA, Brilinta load at [facility]. Will start ASA and brilinta, high intensity statin  - Will continue home metoprolol tartrate 25mg BID  - Echo ordered  - Trend trops  - F/u lipid profile  - Keep Mg>2, K>4    #HTN  - Will start home amlodipine when able    RESPIRATORY:  - No active issues, on RA.    GI/NUTRITION:  - No active issues  Diet: Carb consistent    RENAL:   - No active issues. Will monitor Cr post contrast    ID:   - No active issues.    HEME:  - Maintain active type and screen    ENDOCRINE:  #DM  - Will hold home antidiabetics and start ISS.  - F/u A1C  - Monitor finger sticks    DVT PPX:  - Heparin 5000 subq    ETHICS:  - Full code    Fellow addendum:   63yo with Dm, htn, hld presenting with chest pain and stemi w/ inferolateral involvement, found to have 99% occlusion to LAD s/p 1 TIA   Post ACS protocol with monitoring in CICU with TTE to follow

## 2022-09-15 NOTE — H&P ADULT - NSHPLABSRESULTS_GEN_ALL_CORE
LABS:                         16.2   7.57  )-----------( 304      ( 15 Sep 2022 17:25 )             48.2     09-15    138  |  108  |  6<L>  ----------------------------<  112<H>  3.7   |  24  |  0.64    Ca    10.8<H>      15 Sep 2022 17:25  Mg     2.2     09-15    TPro  8.2  /  Alb  4.1  /  TBili  0.3  /  DBili  x   /  AST  17  /  ALT  23  /  AlkPhos  81  09-15    PT/INR - ( 15 Sep 2022 13:23 )   PT: 11.3 sec;   INR: 0.95 ratio         PTT - ( 15 Sep 2022 13:23 )  PTT:38.5 sec    Serum Pro-Brain Natriuretic Peptide: 59 pg/mL (09-15 @ 13:23)        Cath report as above.

## 2022-09-15 NOTE — H&P ADULT - NSHPPHYSICALEXAM_GEN_ALL_CORE
VITALS:   T(C): 36.6 (09-15-22 @ 19:30), Max: 37 (09-15-22 @ 18:09)  HR: 79 (09-15-22 @ 20:00) (68 - 109)  BP: 129/91 (09-15-22 @ 19:45) (129/91 - 187/121)  RR: 23 (09-15-22 @ 20:00) (16 - 27)  SpO2: 100% (09-15-22 @ 20:00) (97% - 100%)    GENERAL: NAD, lying in bed comfortably. Pleasant  HEAD:  Atraumatic, Normocephalic  EYES: EOMI, PERRLA, conjunctiva and sclera clear  ENT: Moist mucous membranes  NECK: Supple, No JVD  CHEST/LUNG: Clear to auscultation bilaterally; No rales, rhonchi, wheezing, or rubs. Unlabored respirations  HEART: Regular rate and rhythm; No murmurs.  Peripheral edema: None  ABDOMEN: BSx4; Soft, nontender, nondistended  EXTREMITIES:  2+ radial pulses, 2+ dorsalis pedis. Right radial band present, clean and dry  NERVOUS SYSTEM:  A&Ox3, no focal deficits   SKIN: No rashes or lesions

## 2022-09-15 NOTE — H&P ADULT - NSICDXFAMILYHX_GEN_ALL_CORE_FT
FAMILY HISTORY:  Sibling  Still living? Unknown  FH: brain aneurysm, Age at diagnosis: Age Unknown

## 2022-09-15 NOTE — H&P ADULT - NSHPSOCIALHISTORY_GEN_ALL_CORE
Works as a nanny for an EM physician. From [facility]. Has a 26 YO son, has a boyfriend who lives in [place].  Never smoker. Social alcohol. No other drugs

## 2022-09-15 NOTE — PATIENT PROFILE ADULT - FALL HARM RISK - UNIVERSAL INTERVENTIONS
Bed in lowest position, wheels locked, appropriate side rails in place/Call bell, personal items and telephone in reach/Instruct patient to call for assistance before getting out of bed or chair/Non-slip footwear when patient is out of bed/[place] to call system/Physically safe environment - no spills, clutter or unnecessary equipment/Purposeful Proactive Rounding/Room/bathroom lighting operational, light cord in reach

## 2022-09-15 NOTE — H&P ADULT - HISTORY OF PRESENT ILLNESS
The patient is a 63YO F with a PMH of HTN, HLD, DM who presented with chest pain/pressure associated with SOB since yesterday. She thought it was related to GERD, so she took a Pepsi. But the pain persisted until this am. 8/10 which then worsened when she went to the emergency room. EKG showed ST elevations in II III AVF and patient was transferred to [facility] for urgent Cath.    Cath w/99% occlusion to LAD s/p 1 TIA. Given ASA, brilinta load at [facility]. Started on high intensity statin.    At bedside, the patient is pain free with no shortness of breath. She is thankful for her care.    Cath Report:  Diagnostic Findings:   Coronary Angiography   The coronary circulation is right dominant.    LM   Left main artery: There is a 20 % stenosis.    LAD   Left anterior descending artery: There is a 99 % stenosis. Lesion length 20mm UMANG Flow 2  CX   Circumflex: There is a 40 % stenosis.    RCA   Right coronary artery: There is a 30 % stenosis.    Left Heart Cath   Ejection fraction is 40%.        Home Meds:  Metformin 1000 BID  Metoprolol tartrate 25mg BID  Glyburide 5mg BID  Amlodipine 10 qd

## 2022-09-15 NOTE — H&P ADULT - ATTENDING COMMENTS
Admitted with anterior wall STEMI s/p PCI  DAPT with ASA, Ticagrelor   Lipitor 80  Hemodynamics acceptable, chest pain free - start beta blocker  Check TTE  Trend cardiac enzymes until peak  O2 sats mid 90s on room air  Normal renal function, compensated n exam  H/H acceptable  COVID negative, no antibiotics   Sugars controlled, check Hgb A1c   No central access

## 2022-09-16 PROBLEM — E78.5 HYPERLIPIDEMIA, UNSPECIFIED: Chronic | Status: ACTIVE | Noted: 2022-09-15

## 2022-09-16 LAB
A1C WITH ESTIMATED AVERAGE GLUCOSE RESULT: 6.1 % — HIGH (ref 4–5.6)
A1C WITH ESTIMATED AVERAGE GLUCOSE RESULT: 6.3 % — HIGH (ref 4–5.6)
ALBUMIN SERPL ELPH-MCNC: 4.6 G/DL — SIGNIFICANT CHANGE UP (ref 3.3–5)
ALBUMIN SERPL ELPH-MCNC: 4.8 G/DL — SIGNIFICANT CHANGE UP (ref 3.3–5)
ALP SERPL-CCNC: 71 U/L — SIGNIFICANT CHANGE UP (ref 40–120)
ALP SERPL-CCNC: 73 U/L — SIGNIFICANT CHANGE UP (ref 40–120)
ALT FLD-CCNC: 30 U/L — SIGNIFICANT CHANGE UP (ref 10–45)
ALT FLD-CCNC: 33 U/L — SIGNIFICANT CHANGE UP (ref 10–45)
ANION GAP SERPL CALC-SCNC: 11 MMOL/L — SIGNIFICANT CHANGE UP (ref 5–17)
ANION GAP SERPL CALC-SCNC: 12 MMOL/L — SIGNIFICANT CHANGE UP (ref 5–17)
APTT BLD: 32.4 SEC — SIGNIFICANT CHANGE UP (ref 27.5–35.5)
AST SERPL-CCNC: 107 U/L — HIGH (ref 10–40)
AST SERPL-CCNC: 127 U/L — HIGH (ref 10–40)
BASOPHILS # BLD AUTO: 0.06 K/UL — SIGNIFICANT CHANGE UP (ref 0–0.2)
BASOPHILS NFR BLD AUTO: 0.6 % — SIGNIFICANT CHANGE UP (ref 0–2)
BILIRUB SERPL-MCNC: 0.4 MG/DL — SIGNIFICANT CHANGE UP (ref 0.2–1.2)
BILIRUB SERPL-MCNC: 0.7 MG/DL — SIGNIFICANT CHANGE UP (ref 0.2–1.2)
BLD GP AB SCN SERPL QL: NEGATIVE — SIGNIFICANT CHANGE UP
BUN SERPL-MCNC: 10 MG/DL — SIGNIFICANT CHANGE UP (ref 7–23)
BUN SERPL-MCNC: 9 MG/DL — SIGNIFICANT CHANGE UP (ref 7–23)
CALCIUM SERPL-MCNC: 10.3 MG/DL — SIGNIFICANT CHANGE UP (ref 8.4–10.5)
CALCIUM SERPL-MCNC: 9.7 MG/DL — SIGNIFICANT CHANGE UP (ref 8.4–10.5)
CHLORIDE SERPL-SCNC: 104 MMOL/L — SIGNIFICANT CHANGE UP (ref 96–108)
CHLORIDE SERPL-SCNC: 106 MMOL/L — SIGNIFICANT CHANGE UP (ref 96–108)
CHOLEST SERPL-MCNC: 234 MG/DL — HIGH
CK MB BLD-MCNC: 10.2 % — HIGH (ref 0–3.5)
CK MB BLD-MCNC: 9.1 % — HIGH (ref 0–3.5)
CK MB CFR SERPL CALC: 112.4 NG/ML — HIGH (ref 0–3.8)
CK MB CFR SERPL CALC: 69.5 NG/ML — HIGH (ref 0–3.8)
CK SERPL-CCNC: 1100 U/L — HIGH (ref 25–170)
CK SERPL-CCNC: 763 U/L — HIGH (ref 25–170)
CO2 SERPL-SCNC: 23 MMOL/L — SIGNIFICANT CHANGE UP (ref 22–31)
CO2 SERPL-SCNC: 23 MMOL/L — SIGNIFICANT CHANGE UP (ref 22–31)
CREAT SERPL-MCNC: 0.48 MG/DL — LOW (ref 0.5–1.3)
CREAT SERPL-MCNC: 0.66 MG/DL — SIGNIFICANT CHANGE UP (ref 0.5–1.3)
EGFR: 100 ML/MIN/1.73M2 — SIGNIFICANT CHANGE UP
EGFR: 108 ML/MIN/1.73M2 — SIGNIFICANT CHANGE UP
EOSINOPHIL # BLD AUTO: 0.11 K/UL — SIGNIFICANT CHANGE UP (ref 0–0.5)
EOSINOPHIL NFR BLD AUTO: 1.2 % — SIGNIFICANT CHANGE UP (ref 0–6)
ESTIMATED AVERAGE GLUCOSE: 128 MG/DL — HIGH (ref 68–114)
ESTIMATED AVERAGE GLUCOSE: 134 MG/DL — HIGH (ref 68–114)
GLUCOSE BLDC GLUCOMTR-MCNC: 126 MG/DL — HIGH (ref 70–99)
GLUCOSE BLDC GLUCOMTR-MCNC: 136 MG/DL — HIGH (ref 70–99)
GLUCOSE BLDC GLUCOMTR-MCNC: 148 MG/DL — HIGH (ref 70–99)
GLUCOSE SERPL-MCNC: 130 MG/DL — HIGH (ref 70–99)
GLUCOSE SERPL-MCNC: 187 MG/DL — HIGH (ref 70–99)
HCT VFR BLD CALC: 43.3 % — SIGNIFICANT CHANGE UP (ref 34.5–45)
HCV AB S/CO SERPL IA: 0.16 S/CO — SIGNIFICANT CHANGE UP (ref 0–0.99)
HCV AB SERPL-IMP: SIGNIFICANT CHANGE UP
HDLC SERPL-MCNC: 61 MG/DL — SIGNIFICANT CHANGE UP
HGB BLD-MCNC: 14.4 G/DL — SIGNIFICANT CHANGE UP (ref 11.5–15.5)
IMM GRANULOCYTES NFR BLD AUTO: 0.4 % — SIGNIFICANT CHANGE UP (ref 0–0.9)
INR BLD: 1.01 RATIO — SIGNIFICANT CHANGE UP (ref 0.88–1.16)
LACTATE SERPL-SCNC: 1.6 MMOL/L — SIGNIFICANT CHANGE UP (ref 0.5–2)
LIPID PNL WITH DIRECT LDL SERPL: 154 MG/DL — HIGH
LYMPHOCYTES # BLD AUTO: 2.31 K/UL — SIGNIFICANT CHANGE UP (ref 1–3.3)
LYMPHOCYTES # BLD AUTO: 24.4 % — SIGNIFICANT CHANGE UP (ref 13–44)
MAGNESIUM SERPL-MCNC: 2.4 MG/DL — SIGNIFICANT CHANGE UP (ref 1.6–2.6)
MAGNESIUM SERPL-MCNC: 2.6 MG/DL — SIGNIFICANT CHANGE UP (ref 1.6–2.6)
MCHC RBC-ENTMCNC: 28.2 PG — SIGNIFICANT CHANGE UP (ref 27–34)
MCHC RBC-ENTMCNC: 33.3 GM/DL — SIGNIFICANT CHANGE UP (ref 32–36)
MCV RBC AUTO: 84.7 FL — SIGNIFICANT CHANGE UP (ref 80–100)
MONOCYTES # BLD AUTO: 0.83 K/UL — SIGNIFICANT CHANGE UP (ref 0–0.9)
MONOCYTES NFR BLD AUTO: 8.8 % — SIGNIFICANT CHANGE UP (ref 2–14)
NEUTROPHILS # BLD AUTO: 6.1 K/UL — SIGNIFICANT CHANGE UP (ref 1.8–7.4)
NEUTROPHILS NFR BLD AUTO: 64.6 % — SIGNIFICANT CHANGE UP (ref 43–77)
NON HDL CHOLESTEROL: 174 MG/DL — HIGH
NRBC # BLD: 0 /100 WBCS — SIGNIFICANT CHANGE UP (ref 0–0)
PHOSPHATE SERPL-MCNC: 2.2 MG/DL — LOW (ref 2.5–4.5)
PHOSPHATE SERPL-MCNC: 2.9 MG/DL — SIGNIFICANT CHANGE UP (ref 2.5–4.5)
PLATELET # BLD AUTO: 286 K/UL — SIGNIFICANT CHANGE UP (ref 150–400)
POTASSIUM SERPL-MCNC: 3.9 MMOL/L — SIGNIFICANT CHANGE UP (ref 3.5–5.3)
POTASSIUM SERPL-MCNC: 4.1 MMOL/L — SIGNIFICANT CHANGE UP (ref 3.5–5.3)
POTASSIUM SERPL-SCNC: 3.9 MMOL/L — SIGNIFICANT CHANGE UP (ref 3.5–5.3)
POTASSIUM SERPL-SCNC: 4.1 MMOL/L — SIGNIFICANT CHANGE UP (ref 3.5–5.3)
PROT SERPL-MCNC: 7.8 G/DL — SIGNIFICANT CHANGE UP (ref 6–8.3)
PROT SERPL-MCNC: 8.3 G/DL — SIGNIFICANT CHANGE UP (ref 6–8.3)
PROTHROM AB SERPL-ACNC: 11.7 SEC — SIGNIFICANT CHANGE UP (ref 10.5–13.4)
RBC # BLD: 5.11 M/UL — SIGNIFICANT CHANGE UP (ref 3.8–5.2)
RBC # FLD: 12.4 % — SIGNIFICANT CHANGE UP (ref 10.3–14.5)
RH IG SCN BLD-IMP: POSITIVE — SIGNIFICANT CHANGE UP
SODIUM SERPL-SCNC: 139 MMOL/L — SIGNIFICANT CHANGE UP (ref 135–145)
SODIUM SERPL-SCNC: 140 MMOL/L — SIGNIFICANT CHANGE UP (ref 135–145)
TRIGL SERPL-MCNC: 98 MG/DL — SIGNIFICANT CHANGE UP
TROPONIN T, HIGH SENSITIVITY RESULT: 1401 NG/L — HIGH (ref 0–51)
TROPONIN T, HIGH SENSITIVITY RESULT: 2683 NG/L — HIGH (ref 0–51)
TSH SERPL-MCNC: 1.68 UIU/ML — SIGNIFICANT CHANGE UP (ref 0.27–4.2)
UFH PPP CHRO-ACNC: 0 IU/ML — LOW (ref 0.3–0.7)
WBC # BLD: 9.45 K/UL — SIGNIFICANT CHANGE UP (ref 3.8–10.5)
WBC # FLD AUTO: 9.45 K/UL — SIGNIFICANT CHANGE UP (ref 3.8–10.5)

## 2022-09-16 PROCEDURE — 99232 SBSQ HOSP IP/OBS MODERATE 35: CPT

## 2022-09-16 PROCEDURE — 93010 ELECTROCARDIOGRAM REPORT: CPT

## 2022-09-16 PROCEDURE — 99233 SBSQ HOSP IP/OBS HIGH 50: CPT | Mod: 25

## 2022-09-16 PROCEDURE — 93306 TTE W/DOPPLER COMPLETE: CPT | Mod: 26

## 2022-09-16 RX ORDER — POTASSIUM CHLORIDE 20 MEQ
20 PACKET (EA) ORAL ONCE
Refills: 0 | Status: COMPLETED | OUTPATIENT
Start: 2022-09-16 | End: 2022-09-16

## 2022-09-16 RX ORDER — LOSARTAN POTASSIUM 100 MG/1
25 TABLET, FILM COATED ORAL DAILY
Refills: 0 | Status: DISCONTINUED | OUTPATIENT
Start: 2022-09-16 | End: 2022-09-17

## 2022-09-16 RX ORDER — AMLODIPINE BESYLATE 2.5 MG/1
5 TABLET ORAL DAILY
Refills: 0 | Status: DISCONTINUED | OUTPATIENT
Start: 2022-09-16 | End: 2022-09-16

## 2022-09-16 RX ORDER — TICAGRELOR 90 MG/1
1 TABLET ORAL
Qty: 60 | Refills: 0
Start: 2022-09-16 | End: 2022-10-15

## 2022-09-16 RX ADMIN — LOSARTAN POTASSIUM 25 MILLIGRAM(S): 100 TABLET, FILM COATED ORAL at 13:14

## 2022-09-16 RX ADMIN — Medication 20 MILLIEQUIVALENT(S): at 05:38

## 2022-09-16 RX ADMIN — Medication 81 MILLIGRAM(S): at 11:25

## 2022-09-16 RX ADMIN — HEPARIN SODIUM 5000 UNIT(S): 5000 INJECTION INTRAVENOUS; SUBCUTANEOUS at 05:38

## 2022-09-16 RX ADMIN — TICAGRELOR 90 MILLIGRAM(S): 90 TABLET ORAL at 05:38

## 2022-09-16 RX ADMIN — ATORVASTATIN CALCIUM 80 MILLIGRAM(S): 80 TABLET, FILM COATED ORAL at 20:44

## 2022-09-16 RX ADMIN — TICAGRELOR 90 MILLIGRAM(S): 90 TABLET ORAL at 17:09

## 2022-09-16 RX ADMIN — AMLODIPINE BESYLATE 5 MILLIGRAM(S): 2.5 TABLET ORAL at 05:37

## 2022-09-16 RX ADMIN — Medication 25 MILLIGRAM(S): at 17:09

## 2022-09-16 RX ADMIN — HEPARIN SODIUM 5000 UNIT(S): 5000 INJECTION INTRAVENOUS; SUBCUTANEOUS at 17:08

## 2022-09-16 RX ADMIN — CHLORHEXIDINE GLUCONATE 1 APPLICATION(S): 213 SOLUTION TOPICAL at 20:46

## 2022-09-16 RX ADMIN — Medication 25 MILLIGRAM(S): at 05:37

## 2022-09-16 NOTE — PROGRESS NOTE ADULT - NS ATTEND AMEND GEN_ALL_CORE FT
Admitted with anterior wall STEMI s/p PCI  DAPT with ASA, Ticagrelor   Lipitor 80  Hemodynamics acceptable, chest pain free - continue beta blocker  TTE with severely reduced LVEF - start Losartan  Trend cardiac enzymes until peak  O2 sats mid 90s on room air  Normal renal function, compensated on exam  H/H acceptable  COVID negative, no antibiotics   Sugars controlled  No central access

## 2022-09-16 NOTE — PROGRESS NOTE ADULT - SUBJECTIVE AND OBJECTIVE BOX
IRIS BRAMBILA  MRN-[id number]  Patient is a 62y old  Female who presents with a chief complaint of   HPI:  The patient is a 61YO F with a PMH of HTN, HLD, DM who presented with chest pain/pressure associated with SOB since yesterday. She thought it was related to GERD, so she took a Pepsi. But the pain persisted until this am. 8/10 which then worsened when she went to the emergency room. EKG showed ST elevations in II III AVF and patient was transferred to [facility] for urgent Cath.    Cath w/99% occlusion to LAD s/p 1 TIA. Given ASA, brilinta load at [facility]. Started on high intensity statin.    At bedside, the patient is pain free with no shortness of breath. She is thankful for her care.    Cath Report:  Diagnostic Findings:   Coronary Angiography   The coronary circulation is right dominant.    LM   Left main artery: There is a 20 % stenosis.    LAD   Left anterior descending artery: There is a 99 % stenosis. Lesion length 20mm UMANG Flow 2  CX   Circumflex: There is a 40 % stenosis.    RCA   Right coronary artery: There is a 30 % stenosis.    Left Heart Cath   Ejection fraction is 40%.        Home Meds:  Metformin 1000 BID  Metoprolol tartrate 25mg BID  Glyburide 5mg BID  Amlodipine 10 qd   (15 Sep 2022 19:20)      Overnight events: s/p TIA x1 to LAD. Radial band removed without complication. CP free overnight.     REVIEW OF SYSTEMS:    CONSTITUTIONAL: No weakness, fevers or chills  EYES/ENT: No visual changes;  No vertigo or throat pain   NECK: No pain or stiffness  RESPIRATORY: No cough, wheezing, hemoptysis; No shortness of breath  CARDIOVASCULAR: No chest pain or palpitations  GASTROINTESTINAL: No abdominal or epigastric pain. No nausea, vomiting, or hematemesis; No diarrhea or constipation. No melena or hematochezia.  GENITOURINARY: No dysuria, frequency or hematuria  NEUROLOGICAL: No numbness or weakness  SKIN: No itching, rashes      Physical Exam:  Vital Signs Last 24 Hrs  T(C): 36.8 (16 Sep 2022 03:00), Max: 37 (15 Sep 2022 18:09)  T(F): 98.2 (16 Sep 2022 03:00), Max: 98.6 (15 Sep 2022 18:09)  HR: 70 (16 Sep 2022 06:00) (68 - 109)  BP: 134/83 (16 Sep 2022 06:00) (124/83 - 187/121)  BP(mean): 102 (16 Sep 2022 06:00) (99 - 123)  RR: 17 (16 Sep 2022 06:00) (14 - 31)  SpO2: 96% (16 Sep 2022 06:00) (93% - 100%)    Parameters below as of 16 Sep 2022 06:00  Patient On (Oxygen Delivery Method): room air      Physical Exam:   Constitutional: NAD, well-groomed, well-developed  HEENT: PERRLA, EOMI, no drainage or redness  Neck: supple,  No JVD  Respiratory: Breath Sounds equal & clear bilaterally to auscultation, no rales/rhonchi/wheezing, no accessory muscle use noted  Cardiovascular: Regular rate, regular rhythm, normal S1, S2; no murmurs or rub  Gastrointestinal: Soft, non-tender, non distended, + bowel sounds  Extremities: ARAMBULA x 4, no peripheral edema, no cyanosis, no clubbing   Neurological: A+O x 3; speech clear and intact; no sensory, motor  deficits, normal reflexes  Skin: warm, dry, well perfused    ============================I/O===========================   I&O's Detail    15 Sep 2022 07:01  -  16 Sep 2022 06:41  --------------------------------------------------------  IN:    IV PiggyBack: 50 mL    Oral Fluid: 480 mL  Total IN: 530 mL    OUT:    Voided (mL): 1450 mL  Total OUT: 1450 mL    Total NET: -920 mL        ============================ LABS =========================                        14.4   9.45  )-----------( 286      ( 16 Sep 2022 03:57 )             43.3     09-16    140  |  106  |  10  ----------------------------<  130<H>  3.9   |  23  |  0.66    Ca    9.7      16 Sep 2022 03:57  Phos  2.9     09-16  Mg     2.6     09-16    TPro  7.8  /  Alb  4.6  /  TBili  0.4  /  DBili  x   /  AST  127<H>  /  ALT  30  /  AlkPhos  71  09-16    LIVER FUNCTIONS - ( 16 Sep 2022 03:57 )  Alb: 4.6 g/dL / Pro: 7.8 g/dL / ALK PHOS: 71 U/L / ALT: 30 U/L / AST: 127 U/L / GGT: x           PT/INR - ( 16 Sep 2022 03:57 )   PT: 11.7 sec;   INR: 1.01 ratio         PTT - ( 16 Sep 2022 03:57 )  PTT:32.4 sec      ======================Micro/Rad/Cardio=================  Culture: Reviewed   CXR: Reviewed  Echo:Reviewed  ======================================================  PAST MEDICAL & SURGICAL HISTORY:  HTN (hypertension)      DM (diabetes mellitus)      Hyperlipidemia      History of cholecystectomy

## 2022-09-16 NOTE — PROGRESS NOTE ADULT - SUBJECTIVE AND OBJECTIVE BOX
Admission date:  CHIEF COMPLAINT:  HPI:  The patient is a 61YO F with a PMH of HTN, HLD, DM who presented with chest pain/pressure associated with SOB since yesterday. She thought it was related to GERD, so she took a Pepsi. But the pain persisted until this am. 8/10 which then worsened when she went to the emergency room. EKG showed ST elevations in II III AVF and patient was transferred to [facility] for urgent Cath.    Cath w/99% occlusion to LAD s/p 1 TIA. Given ASA, brilinta load at [facility]. Started on high intensity statin.    At bedside, the patient is pain free with no shortness of breath. She is thankful for her care.    Cath Report:  Diagnostic Findings:   Coronary Angiography   The coronary circulation is right dominant.    LM   Left main artery: There is a 20 % stenosis.    LAD   Left anterior descending artery: There is a 99 % stenosis. Lesion length 20mm UMANG Flow 2  CX   Circumflex: There is a 40 % stenosis.    RCA   Right coronary artery: There is a 30 % stenosis.    Left Heart Cath   Ejection fraction is 40%.        Home Meds:  Metformin 1000 BID  Metoprolol tartrate 25mg BID  Glyburide 5mg BID  Amlodipine 10 qd   (15 Sep 2022 19:20)    INTERVAL HISTORY:    REVIEW OF SYSTEMS: Denies xxxxxx; all others negative    MEDICATIONS  (STANDING):  aspirin  chewable 81 milliGRAM(s) Oral daily  atorvastatin 80 milliGRAM(s) Oral at bedtime  chlorhexidine 2% Cloths 1 Application(s) Topical daily  dextrose 5%. 1000 milliLiter(s) (50 mL/Hr) IV Continuous <Continuous>  dextrose 5%. 1000 milliLiter(s) (100 mL/Hr) IV Continuous <Continuous>  dextrose 50% Injectable 25 Gram(s) IV Push once  dextrose 50% Injectable 12.5 Gram(s) IV Push once  dextrose 50% Injectable 25 Gram(s) IV Push once  glucagon  Injectable 1 milliGRAM(s) IntraMuscular once  heparin   Injectable 5000 Unit(s) SubCutaneous every 12 hours  influenza   Vaccine 0.5 milliLiter(s) IntraMuscular once  insulin lispro (ADMELOG) corrective regimen sliding scale   SubCutaneous three times a day before meals  losartan 25 milliGRAM(s) Oral daily  metoprolol tartrate 25 milliGRAM(s) Oral two times a day  ticagrelor 90 milliGRAM(s) Oral every 12 hours    MEDICATIONS  (PRN):  dextrose Oral Gel 15 Gram(s) Oral once PRN Blood Glucose LESS THAN 70 milliGRAM(s)/deciliter      Objective:  ICU Vital Signs Last 24 Hrs  T(C): 36.9 (16 Sep 2022 20:00), Max: 36.9 (16 Sep 2022 08:00)  T(F): 98.5 (16 Sep 2022 20:00), Max: 98.5 (16 Sep 2022 20:00)  HR: 78 (16 Sep 2022 22:00) (67 - 92)  BP: 134/91 (16 Sep 2022 22:00) (101/77 - 151/98)  BP(mean): 109 (16 Sep 2022 22:00) (85 - 119)  ABP: --  ABP(mean): --  RR: 20 (16 Sep 2022 22:00) (14 - 29)  SpO2: 98% (16 Sep 2022 22:00) (91% - 100%)    O2 Parameters below as of 16 Sep 2022 22:00  Patient On (Oxygen Delivery Method): room air            Adult Advanced Hemodynamics Last 24 Hrs  CVP(mm Hg): --  CVP(cm H2O): --  CO: --  CI: --  PA: --  PA(mean): --  PCWP: --  SVR: --  SVRI: --  PVR: --  PVRI: --      09-15 @ 07:01 - 09-16 @ 07:00  --------------------------------------------------------  IN: 530 mL / OUT: 1450 mL / NET: -920 mL    09-16 @ 07:01 - 09-16 @ 22:56  --------------------------------------------------------  IN: 720 mL / OUT: 600 mL / NET: 120 mL      Daily     Daily     PHYSICAL EXAM:      Constitutional:    HEENT:    Respiratory:    Cardiovascular:  Access site:    Gastrointestinal:    Genitourinary:    Extremities:    Vascular:    Neurological:    Skin:      TELEMETRY:     EKG:     IMAGING:    Labs:                          14.4   9.45  )-----------( 286      ( 16 Sep 2022 03:57 )             43.3     09-16    139  |  104  |  9   ----------------------------<  187<H>  4.1   |  23  |  0.48<L>    Ca    10.3      16 Sep 2022 11:02  Phos  2.2     09-16  Mg     2.4     09-16    TPro  8.3  /  Alb  4.8  /  TBili  0.7  /  DBili  x   /  AST  107<H>  /  ALT  33  /  AlkPhos  73  09-16    LIVER FUNCTIONS - ( 16 Sep 2022 11:02 )  Alb: 4.8 g/dL / Pro: 8.3 g/dL / ALK PHOS: 73 U/L / ALT: 33 U/L / AST: 107 U/L / GGT: x           PT/INR - ( 16 Sep 2022 03:57 )   PT: 11.7 sec;   INR: 1.01 ratio         PTT - ( 16 Sep 2022 03:57 )  PTT:32.4 sec  CKMB Units: 69.5 ng/mL (09-16 @ 11:02)  Creatine Kinase, Serum: 763 U/L (09-16 @ 11:02)  CKMB Units: 112.4 ng/mL (09-16 @ 03:57)  Creatine Kinase, Serum: 1100 U/L (09-16 @ 03:57)        HEALTH ISSUES - PROBLEM Dx:            I have personally provided ______ minutes of critical care time excluding time spent on separate procedures, in addition to initial critical care time provided by the CICU Attending, _____.    HPI:  The patient is a 61YO F with a PMH of HTN, HLD, DM who presented with chest pain/pressure associated with SOB since yesterday. She thought it was related to GERD, so she took a Pepsi. But the pain persisted until this am. 8/10 which then worsened when she went to the emergency room. EKG showed ST elevations in II III AVF and patient was transferred to [facility] for urgent Cath.    Cath w/99% occlusion to LAD s/p 1 TIA. Given ASA, brilinta load at [facility]. Started on high intensity statin.    At bedside, the patient is pain free with no shortness of breath. She is thankful for her care.    Cath Report:  Diagnostic Findings:   Coronary Angiography   The coronary circulation is right dominant.    LM   Left main artery: There is a 20 % stenosis.    LAD   Left anterior descending artery: There is a 99 % stenosis. Lesion length 20mm UMANG Flow 2  CX   Circumflex: There is a 40 % stenosis.    RCA   Right coronary artery: There is a 30 % stenosis.    Left Heart Cath   Ejection fraction is 40%.        Home Meds:  Metformin 1000 BID  Metoprolol tartrate 25mg BID  Glyburide 5mg BID  Amlodipine 10 qd   (15 Sep 2022 19:20)    INTERVAL HISTORY:  - no acute events    MEDICATIONS  (STANDING):  aspirin  chewable 81 milliGRAM(s) Oral daily  atorvastatin 80 milliGRAM(s) Oral at bedtime  chlorhexidine 2% Cloths 1 Application(s) Topical daily  dextrose 5%. 1000 milliLiter(s) (50 mL/Hr) IV Continuous <Continuous>  dextrose 5%. 1000 milliLiter(s) (100 mL/Hr) IV Continuous <Continuous>  dextrose 50% Injectable 25 Gram(s) IV Push once  dextrose 50% Injectable 12.5 Gram(s) IV Push once  dextrose 50% Injectable 25 Gram(s) IV Push once  glucagon  Injectable 1 milliGRAM(s) IntraMuscular once  heparin   Injectable 5000 Unit(s) SubCutaneous every 12 hours  influenza   Vaccine 0.5 milliLiter(s) IntraMuscular once  insulin lispro (ADMELOG) corrective regimen sliding scale   SubCutaneous three times a day before meals  losartan 25 milliGRAM(s) Oral daily  metoprolol tartrate 25 milliGRAM(s) Oral two times a day  ticagrelor 90 milliGRAM(s) Oral every 12 hours    MEDICATIONS  (PRN):  dextrose Oral Gel 15 Gram(s) Oral once PRN Blood Glucose LESS THAN 70 milliGRAM(s)/deciliter      Objective:  ICU Vital Signs Last 24 Hrs  T(C): 36.9 (16 Sep 2022 20:00), Max: 36.9 (16 Sep 2022 08:00)  T(F): 98.5 (16 Sep 2022 20:00), Max: 98.5 (16 Sep 2022 20:00)  HR: 78 (16 Sep 2022 22:00) (67 - 92)  BP: 134/91 (16 Sep 2022 22:00) (101/77 - 151/98)  BP(mean): 109 (16 Sep 2022 22:00) (85 - 119)  RR: 20 (16 Sep 2022 22:00) (14 - 29)  SpO2: 98% (16 Sep 2022 22:00) (91% - 100%)    O2 Parameters below as of 16 Sep 2022 22:00  Patient On (Oxygen Delivery Method): room air      09-15 @ 07:01  -  09-16 @ 07:00  --------------------------------------------------------  IN: 530 mL / OUT: 1450 mL / NET: -920 mL    09-16 @ 07:01  -  09-16 @ 22:56  --------------------------------------------------------  IN: 720 mL / OUT: 600 mL / NET: 120 mL          LABS:                      14.4   9.45  )-----------( 286      ( 16 Sep 2022 03:57 )             43.3     09-16    139  |  104  |  9   ----------------------------<  187<H>  4.1   |  23  |  0.48<L>    Ca    10.3      16 Sep 2022 11:02  Phos  2.2     09-16  Mg     2.4     09-16    TPro  8.3  /  Alb  4.8  /  TBili  0.7  /  DBili  x   /  AST  107<H>  /  ALT  33  /  AlkPhos  73  09-16    LIVER FUNCTIONS - ( 16 Sep 2022 11:02 )  Alb: 4.8 g/dL / Pro: 8.3 g/dL / ALK PHOS: 73 U/L / ALT: 33 U/L / AST: 107 U/L / GGT: x           PT/INR - ( 16 Sep 2022 03:57 )   PT: 11.7 sec;   INR: 1.01 ratio    PTT - ( 16 Sep 2022 03:57 )  PTT:32.4 sec    CKMB Units: 69.5 ng/mL (09-16 @ 11:02)  Creatine Kinase, Serum: 763 U/L (09-16 @ 11:02)  CKMB Units: 112.4 ng/mL (09-16 @ 03:57)  Creatine Kinase, Serum: 1100 U/L (09-16 @ 03:57)    ASSESSMENT & PLAN:  61 yo Female with HTN, HLD, DM who presented with 1 day of chest pain found to have STEMI and 99% occlusion to LAD s/p 1 TIA via RRA.     NEURO:  Baseline: AOx3, at baseline    CARDIOVASCULAR:  #STEMI S/P TIA to LAD  - EKG with ST elevations in II, III, AVF. Trop 734. Cath with 99% occlusion to LAD   - Cont DAPT with ASA and Brilinta.  - cont high intensity statin therapy  - continue home metoprolol tartrate 25mg BID  - losartan 25mg QD  - TTE EF 37%    #HTN  - cont losartan    RESPIRATORY:  - No active issues, on RA.    GI/NUTRITION:  - No active issues  Diet: Carb consistent    RENAL:   - No active issues. Will monitor Cr post contrast -- remains stable    ID:   - No active issues.    HEME:  - Maintain active type and screen. Trend CBC, No active issues    ENDOCRINE:  #DM  - Will hold home antidiabetics and start ISS.  - A1C 6.1 %  - Monitor finger sticks    DVT PPX:  - Heparin 5000 subq

## 2022-09-16 NOTE — CHART NOTE - NSCHARTNOTEFT_GEN_A_CORE
Removal of Radial Band    Pulses in the right upper extremity are palpable. The patient was placed in the supine position. The insertion site was identified and the band deflated per protocol. The radial band was removed slowly. Direct pressure was applied for  ___0___ minutes/not applicable.      Monitoring of the right wrists and both upper extremities including neuro-vascular checks and vital signs every 15 minutes x 4.    Complications: None/Other    Comments: Pt tolerated well, no hematoma noted.
CCU Transfer Note    Transfer from: CCU    Transfer to: (  ) Medicine    ( x ) Telemetry    (  ) RCU                               (  ) Palliative    (  ) Stroke Unit    (  ) MICU    (  ) __________________    Accepting Physician: Kathleen Wilson    Signout given to:     HPI / CCU COURSE:  HPI:  The patient is a 61YO F with a PMH of HTN, HLD, DM who presented with chest pain/pressure associated with SOB since yesterday. She thought it was related to GERD, so she took a Pepsi. But the pain persisted until this am. 8/10 which then worsened when she went to the emergency room. EKG showed ST elevations in II III AVF and patient was transferred to [facility] for urgent Cath.    Cath w/99% occlusion to LAD s/p 1 TIA. Given ASA, brilinta load at [facility]. Started on high intensity statin.    At bedside, the patient is pain free with no shortness of breath. She is thankful for her care.    Hospital Course  S/p 1x TIA patient was admitted to the CCU for further monitoring. TTE revealed EF of 37%. Cardiac enzymes downtrending. Patient has been hemodynamically stable and asymtomatic since arrival and during her stay in the CCU. Currently patient is on ASA and Brilinta. She was started on lopressor 25mg and losartan 25mg, and tolerating the medications well. At this time the patient is medically optimized for transfer to medicine.    [] Card recs  [] DC meds, can likely be dc'd on 9/17      Home Meds:  Metformin 1000 BID  Metoprolol tartrate 25mg BID  Glyburide 5mg BID  Amlodipine 10 qd   (15 Sep 2022 19:20)          Vital Signs Last 24 Hrs  T(C): 36.7 (16 Sep 2022 17:00), Max: 37 (15 Sep 2022 18:09)  T(F): 98 (16 Sep 2022 17:00), Max: 98.6 (15 Sep 2022 18:09)  HR: 83 (16 Sep 2022 17:00) (67 - 92)  BP: 126/86 (16 Sep 2022 17:00) (101/77 - 156/101)  BP(mean): 101 (16 Sep 2022 17:00) (85 - 123)  RR: 19 (16 Sep 2022 17:00) (14 - 31)  SpO2: 96% (16 Sep 2022 17:00) (93% - 100%)    Parameters below as of 16 Sep 2022 17:00  Patient On (Oxygen Delivery Method): room air        I&O's Summary    15 Sep 2022 07:01  -  16 Sep 2022 07:00  --------------------------------------------------------  IN: 530 mL / OUT: 1450 mL / NET: -920 mL    16 Sep 2022 07:01  -  16 Sep 2022 17:24  --------------------------------------------------------  IN: 720 mL / OUT: 600 mL / NET: 120 mL        Physical Exam:       LABS:   CARDIAC MARKERS ( 16 Sep 2022 11:02 )  x     / x     / 763 U/L / x     / 69.5 ng/mL  CARDIAC MARKERS ( 16 Sep 2022 03:57 )  x     / x     / 1100 U/L / x     / 112.4 ng/mL  CARDIAC MARKERS ( 15 Sep 2022 20:08 )  x     / x     / 740 U/L / x     / 80.5 ng/mL                              14.4   9.45  )-----------( 286      ( 16 Sep 2022 03:57 )             43.3       09-16    139  |  104  |  9   ----------------------------<  187<H>  4.1   |  23  |  0.48<L>    Ca    10.3      16 Sep 2022 11:02  Phos  2.2     09-16  Mg     2.4     09-16    TPro  8.3  /  Alb  4.8  /  TBili  0.7  /  DBili  x   /  AST  107<H>  /  ALT  33  /  AlkPhos  73  09-16      PT/INR - ( 16 Sep 2022 03:57 )   PT: 11.7 sec;   INR: 1.01 ratio         PTT - ( 16 Sep 2022 03:57 )  PTT:32.4 sec          ECG:    Telemetry:    Imaging:      ASSESSMENT & PLAN:           FOR FOLLOW UP:  [ ]   [ ]   [ ]

## 2022-09-17 VITALS
OXYGEN SATURATION: 96 % | HEART RATE: 74 BPM | SYSTOLIC BLOOD PRESSURE: 116 MMHG | RESPIRATION RATE: 18 BRPM | TEMPERATURE: 98 F | DIASTOLIC BLOOD PRESSURE: 83 MMHG

## 2022-09-17 LAB
ALBUMIN SERPL ELPH-MCNC: 4.4 G/DL — SIGNIFICANT CHANGE UP (ref 3.3–5)
ALP SERPL-CCNC: 76 U/L — SIGNIFICANT CHANGE UP (ref 40–120)
ALT FLD-CCNC: 27 U/L — SIGNIFICANT CHANGE UP (ref 10–45)
ANION GAP SERPL CALC-SCNC: 12 MMOL/L — SIGNIFICANT CHANGE UP (ref 5–17)
AST SERPL-CCNC: 57 U/L — HIGH (ref 10–40)
BASOPHILS # BLD AUTO: 0.04 K/UL — SIGNIFICANT CHANGE UP (ref 0–0.2)
BASOPHILS NFR BLD AUTO: 0.4 % — SIGNIFICANT CHANGE UP (ref 0–2)
BILIRUB SERPL-MCNC: 0.7 MG/DL — SIGNIFICANT CHANGE UP (ref 0.2–1.2)
BUN SERPL-MCNC: 22 MG/DL — SIGNIFICANT CHANGE UP (ref 7–23)
CALCIUM SERPL-MCNC: 10.2 MG/DL — SIGNIFICANT CHANGE UP (ref 8.4–10.5)
CHLORIDE SERPL-SCNC: 103 MMOL/L — SIGNIFICANT CHANGE UP (ref 96–108)
CK MB BLD-MCNC: 7.2 % — HIGH (ref 0–3.5)
CK MB CFR SERPL CALC: 22.2 NG/ML — HIGH (ref 0–3.8)
CK SERPL-CCNC: 310 U/L — HIGH (ref 25–170)
CO2 SERPL-SCNC: 23 MMOL/L — SIGNIFICANT CHANGE UP (ref 22–31)
CREAT SERPL-MCNC: 0.69 MG/DL — SIGNIFICANT CHANGE UP (ref 0.5–1.3)
EGFR: 99 ML/MIN/1.73M2 — SIGNIFICANT CHANGE UP
EOSINOPHIL # BLD AUTO: 0.17 K/UL — SIGNIFICANT CHANGE UP (ref 0–0.5)
EOSINOPHIL NFR BLD AUTO: 1.8 % — SIGNIFICANT CHANGE UP (ref 0–6)
GLUCOSE BLDC GLUCOMTR-MCNC: 183 MG/DL — HIGH (ref 70–99)
GLUCOSE BLDC GLUCOMTR-MCNC: 213 MG/DL — HIGH (ref 70–99)
GLUCOSE SERPL-MCNC: 155 MG/DL — HIGH (ref 70–99)
HCT VFR BLD CALC: 44.6 % — SIGNIFICANT CHANGE UP (ref 34.5–45)
HGB BLD-MCNC: 14.6 G/DL — SIGNIFICANT CHANGE UP (ref 11.5–15.5)
IMM GRANULOCYTES NFR BLD AUTO: 0.4 % — SIGNIFICANT CHANGE UP (ref 0–0.9)
LACTATE SERPL-SCNC: 1.8 MMOL/L — SIGNIFICANT CHANGE UP (ref 0.5–2)
LYMPHOCYTES # BLD AUTO: 2.56 K/UL — SIGNIFICANT CHANGE UP (ref 1–3.3)
LYMPHOCYTES # BLD AUTO: 26.6 % — SIGNIFICANT CHANGE UP (ref 13–44)
MAGNESIUM SERPL-MCNC: 2.1 MG/DL — SIGNIFICANT CHANGE UP (ref 1.6–2.6)
MCHC RBC-ENTMCNC: 27.8 PG — SIGNIFICANT CHANGE UP (ref 27–34)
MCHC RBC-ENTMCNC: 32.7 GM/DL — SIGNIFICANT CHANGE UP (ref 32–36)
MCV RBC AUTO: 85 FL — SIGNIFICANT CHANGE UP (ref 80–100)
MONOCYTES # BLD AUTO: 0.88 K/UL — SIGNIFICANT CHANGE UP (ref 0–0.9)
MONOCYTES NFR BLD AUTO: 9.2 % — SIGNIFICANT CHANGE UP (ref 2–14)
NEUTROPHILS # BLD AUTO: 5.92 K/UL — SIGNIFICANT CHANGE UP (ref 1.8–7.4)
NEUTROPHILS NFR BLD AUTO: 61.6 % — SIGNIFICANT CHANGE UP (ref 43–77)
NRBC # BLD: 0 /100 WBCS — SIGNIFICANT CHANGE UP (ref 0–0)
PHOSPHATE SERPL-MCNC: 3.4 MG/DL — SIGNIFICANT CHANGE UP (ref 2.5–4.5)
PLATELET # BLD AUTO: 309 K/UL — SIGNIFICANT CHANGE UP (ref 150–400)
POTASSIUM SERPL-MCNC: 4 MMOL/L — SIGNIFICANT CHANGE UP (ref 3.5–5.3)
POTASSIUM SERPL-SCNC: 4 MMOL/L — SIGNIFICANT CHANGE UP (ref 3.5–5.3)
PROT SERPL-MCNC: 7.6 G/DL — SIGNIFICANT CHANGE UP (ref 6–8.3)
RBC # BLD: 5.25 M/UL — HIGH (ref 3.8–5.2)
RBC # FLD: 12.6 % — SIGNIFICANT CHANGE UP (ref 10.3–14.5)
SODIUM SERPL-SCNC: 138 MMOL/L — SIGNIFICANT CHANGE UP (ref 135–145)
TROPONIN T, HIGH SENSITIVITY RESULT: 1233 NG/L — HIGH (ref 0–51)
WBC # BLD: 9.61 K/UL — SIGNIFICANT CHANGE UP (ref 3.8–10.5)
WBC # FLD AUTO: 9.61 K/UL — SIGNIFICANT CHANGE UP (ref 3.8–10.5)

## 2022-09-17 PROCEDURE — 83036 HEMOGLOBIN GLYCOSYLATED A1C: CPT

## 2022-09-17 PROCEDURE — C1887: CPT

## 2022-09-17 PROCEDURE — 82550 ASSAY OF CK (CPK): CPT

## 2022-09-17 PROCEDURE — 93010 ELECTROCARDIOGRAM REPORT: CPT

## 2022-09-17 PROCEDURE — 83880 ASSAY OF NATRIURETIC PEPTIDE: CPT

## 2022-09-17 PROCEDURE — 82553 CREATINE MB FRACTION: CPT

## 2022-09-17 PROCEDURE — C1725: CPT

## 2022-09-17 PROCEDURE — C1894: CPT

## 2022-09-17 PROCEDURE — 82962 GLUCOSE BLOOD TEST: CPT

## 2022-09-17 PROCEDURE — C9606: CPT | Mod: LD

## 2022-09-17 PROCEDURE — 85730 THROMBOPLASTIN TIME PARTIAL: CPT

## 2022-09-17 PROCEDURE — 84100 ASSAY OF PHOSPHORUS: CPT

## 2022-09-17 PROCEDURE — C1874: CPT

## 2022-09-17 PROCEDURE — 83605 ASSAY OF LACTIC ACID: CPT

## 2022-09-17 PROCEDURE — 86850 RBC ANTIBODY SCREEN: CPT

## 2022-09-17 PROCEDURE — 93458 L HRT ARTERY/VENTRICLE ANGIO: CPT | Mod: 59

## 2022-09-17 PROCEDURE — 86900 BLOOD TYPING SEROLOGIC ABO: CPT

## 2022-09-17 PROCEDURE — 99239 HOSP IP/OBS DSCHRG MGMT >30: CPT | Mod: 25

## 2022-09-17 PROCEDURE — 93306 TTE W/DOPPLER COMPLETE: CPT

## 2022-09-17 PROCEDURE — 86803 HEPATITIS C AB TEST: CPT

## 2022-09-17 PROCEDURE — 85610 PROTHROMBIN TIME: CPT

## 2022-09-17 PROCEDURE — 80061 LIPID PANEL: CPT

## 2022-09-17 PROCEDURE — 86901 BLOOD TYPING SEROLOGIC RH(D): CPT

## 2022-09-17 PROCEDURE — 84443 ASSAY THYROID STIM HORMONE: CPT

## 2022-09-17 PROCEDURE — 83735 ASSAY OF MAGNESIUM: CPT

## 2022-09-17 PROCEDURE — 85025 COMPLETE CBC W/AUTO DIFF WBC: CPT

## 2022-09-17 PROCEDURE — 85520 HEPARIN ASSAY: CPT

## 2022-09-17 PROCEDURE — 84484 ASSAY OF TROPONIN QUANT: CPT

## 2022-09-17 PROCEDURE — 93005 ELECTROCARDIOGRAM TRACING: CPT

## 2022-09-17 PROCEDURE — C1769: CPT

## 2022-09-17 PROCEDURE — 80053 COMPREHEN METABOLIC PANEL: CPT

## 2022-09-17 PROCEDURE — 36415 COLL VENOUS BLD VENIPUNCTURE: CPT

## 2022-09-17 RX ORDER — TICAGRELOR 90 MG/1
1 TABLET ORAL
Qty: 60 | Refills: 0
Start: 2022-09-17 | End: 2022-10-16

## 2022-09-17 RX ORDER — SPIRONOLACTONE 25 MG/1
1 TABLET, FILM COATED ORAL
Qty: 30 | Refills: 0
Start: 2022-09-17 | End: 2022-10-16

## 2022-09-17 RX ORDER — ASPIRIN/CALCIUM CARB/MAGNESIUM 324 MG
1 TABLET ORAL
Qty: 30 | Refills: 0
Start: 2022-09-17

## 2022-09-17 RX ORDER — LOSARTAN POTASSIUM 100 MG/1
1 TABLET, FILM COATED ORAL
Qty: 30 | Refills: 0
Start: 2022-09-17

## 2022-09-17 RX ORDER — METOPROLOL TARTRATE 50 MG
1 TABLET ORAL
Qty: 30 | Refills: 0
Start: 2022-09-17 | End: 2022-10-16

## 2022-09-17 RX ORDER — LOSARTAN POTASSIUM 100 MG/1
1 TABLET, FILM COATED ORAL
Qty: 0 | Refills: 0 | DISCHARGE
Start: 2022-09-17

## 2022-09-17 RX ORDER — ASPIRIN/CALCIUM CARB/MAGNESIUM 324 MG
1 TABLET ORAL
Qty: 0 | Refills: 0 | DISCHARGE
Start: 2022-09-17

## 2022-09-17 RX ORDER — ATORVASTATIN CALCIUM 80 MG/1
1 TABLET, FILM COATED ORAL
Qty: 30 | Refills: 0
Start: 2022-09-17

## 2022-09-17 RX ORDER — ATORVASTATIN CALCIUM 80 MG/1
1 TABLET, FILM COATED ORAL
Qty: 0 | Refills: 0 | DISCHARGE
Start: 2022-09-17

## 2022-09-17 RX ORDER — SPIRONOLACTONE 25 MG/1
25 TABLET, FILM COATED ORAL ONCE
Refills: 0 | Status: COMPLETED | OUTPATIENT
Start: 2022-09-17 | End: 2022-09-17

## 2022-09-17 RX ADMIN — CHLORHEXIDINE GLUCONATE 1 APPLICATION(S): 213 SOLUTION TOPICAL at 08:00

## 2022-09-17 RX ADMIN — SPIRONOLACTONE 25 MILLIGRAM(S): 25 TABLET, FILM COATED ORAL at 12:03

## 2022-09-17 RX ADMIN — TICAGRELOR 90 MILLIGRAM(S): 90 TABLET ORAL at 05:29

## 2022-09-17 RX ADMIN — Medication 25 MILLIGRAM(S): at 05:29

## 2022-09-17 RX ADMIN — Medication 81 MILLIGRAM(S): at 12:03

## 2022-09-17 RX ADMIN — Medication 1: at 10:15

## 2022-09-17 RX ADMIN — LOSARTAN POTASSIUM 25 MILLIGRAM(S): 100 TABLET, FILM COATED ORAL at 05:29

## 2022-09-17 RX ADMIN — HEPARIN SODIUM 5000 UNIT(S): 5000 INJECTION INTRAVENOUS; SUBCUTANEOUS at 05:29

## 2022-09-17 NOTE — DISCHARGE NOTE NURSING/CASE MANAGEMENT/SOCIAL WORK - NSDCPEFALRISK_GEN_ALL_CORE
For information on Fall & Injury Prevention, visit: [email or web address] OR  [email or web address] OR  https://www.cdc.gov/steadi/patient.html

## 2022-09-17 NOTE — DISCHARGE NOTE PROVIDER - NSDCCPCAREPLAN_GEN_ALL_CORE_FT
PRINCIPAL DISCHARGE DIAGNOSIS  Diagnosis: STEMI (ST elevation myocardial infarction)  Assessment and Plan of Treatment:       SECONDARY DISCHARGE DIAGNOSES  Diagnosis: HTN (hypertension)  Assessment and Plan of Treatment:     Diagnosis: HLD (hyperlipidemia)  Assessment and Plan of Treatment:     Diagnosis: Diabetes mellitus  Assessment and Plan of Treatment:

## 2022-09-17 NOTE — DISCHARGE NOTE PROVIDER - HOSPITAL COURSE
The patient is a 61YO F with a PMH of HTN, HLD, DM who presented with chest pain/pressure associated with SOB since yesterday. She thought it was related to GERD, so she took a Pepsi. But the pain persisted until this am. 8/10 which then worsened when she went to the emergency room. EKG showed ST elevations in II III AVF and patient was transferred to [facility] for urgent Cath.    Cath w/99% occlusion to LAD s/p 1 TIA. Given ASA, brilinta load at [facility]. Started on high intensity statin. Pt has been started on all GDMT and is stable for discharge home.

## 2022-09-17 NOTE — DISCHARGE NOTE NURSING/CASE MANAGEMENT/SOCIAL WORK - PATIENT PORTAL LINK FT
You can access the FollowMyHealth Patient Portal offered by [facility] by registering at the following website: [email or web address]. By joining [employer]’s FollowMyHealth portal, you will also be able to view your health information using other applications (apps) compatible with our system.

## 2022-09-17 NOTE — DISCHARGE NOTE PROVIDER - NSDCMRMEDTOKEN_GEN_ALL_CORE_FT
aspirin 81 mg oral tablet, chewable: 1 tab(s) orally once a day  atorvastatin 80 mg oral tablet: 1 tab(s) orally once a day (at bedtime)  Brilinta (ticagrelor) 90 mg oral tablet: 1 tab(s) orally 2 times a day   glyBURIDE 5 mg oral tablet: 1 tab(s) orally 2 times a day  losartan 25 mg oral tablet: 1 tab(s) orally once a day  metFORMIN 1000 mg oral tablet: 1 tab(s) orally 2 times a day  Toprol-XL 50 mg oral tablet, extended release: 1 tab(s) orally once a day

## 2022-09-17 NOTE — DISCHARGE NOTE PROVIDER - CARE PROVIDERS DIRECT ADDRESSES
[email or web address]

## 2022-09-17 NOTE — DISCHARGE NOTE PROVIDER - CARE PROVIDER_API CALL
Annette Casas)  Cardiology; Internal Medicine  [address] [place] [address], [address]  [place], [place] [id number]  Phone: [phone]  Fax: [phone]  Follow Up Time:

## 2022-09-17 NOTE — PROGRESS NOTE ADULT - ASSESSMENT
Admitted with anterior wall STEMI s/p PCI  DAPT with ASA, Ticagrelor   Lipitor 80  Hemodynamics acceptable, chest pain free - transition Lopressor to Toprol  TTE with severely reduced LVEF - continue Losartan, add Aldactone  Cardiac enzymes have peaked  O2 sats mid 90s on room air  Normal renal function, compensated on exam  H/H acceptable  COVID negative, no antibiotics   Sugars controlled  No central access  OOB/ambulate
61 yo Female with HTN, HLD, DM who presented with 1 day of chest pain found to have STEMI and 99% occlusion to LAD s/p 1 TIA via [facility].     NEURO:  Baseline: AOx3, at baseline    CARDIOVASCULAR:  #STEMI S/P TIA to LAD  - EKG with ST elevations in II, III, AVF. Trop 734. Cath with 99% occlusion to LAD   - Cont DAPT with ASA and Brilinta. Brilinta sent to vivo will f/u insurance coverage.  - cont high intensity statin therapy  - continue home metoprolol tartrate 25mg BID  - Echo ordered  - Trend CE until peak    #HTN  - cont home norvasc     RESPIRATORY:  - No active issues, on RA.    GI/NUTRITION:  - No active issues  Diet: Carb consistent    RENAL:   - No active issues. Will monitor Cr post contrast    ID:   - No active issues.    HEME:  - Maintain active type and screen    ENDOCRINE:  #DM  - Will hold home antidiabetics and start ISS.  - A1C 6.1 %  - Monitor finger sticks    DVT PPX:  - Heparin 5000 subq

## 2022-09-17 NOTE — PROGRESS NOTE ADULT - SUBJECTIVE AND OBJECTIVE BOX
IRIS BRAMBILA  MRN-[id number]  Patient is a 62y old  Female who presents with a chief complaint of STEMI (17 Sep 2022 07:27)    HPI:  The patient is a 61YO F with a PMH of HTN, HLD, DM who presented with chest pain/pressure associated with SOB since yesterday. She thought it was related to GERD, so she took a Pepsi. But the pain persisted until this am. 8/10 which then worsened when she went to the emergency room. EKG showed ST elevations in II III AVF and patient was transferred to [facility] for urgent Cath.    Cath w/99% occlusion to LAD s/p 1 TIA. Given ASA, brilinta load at [facility]. Started on high intensity statin.    At bedside, the patient is pain free with no shortness of breath. She is thankful for her care.    Cath Report:  Diagnostic Findings:   Coronary Angiography   The coronary circulation is right dominant.    LM   Left main artery: There is a 20 % stenosis.    LAD   Left anterior descending artery: There is a 99 % stenosis. Lesion length 20mm UMANG Flow 2  CX   Circumflex: There is a 40 % stenosis.    RCA   Right coronary artery: There is a 30 % stenosis.    Left Heart Cath   Ejection fraction is 40%.      Home Meds:  Metformin 1000 BID  Metoprolol tartrate 25mg BID  Glyburide 5mg BID  Amlodipine 10 qd   (15 Sep 2022 19:20)    24 HOUR EVENTS:  No events overnight. No complaints this morning.     REVIEW OF SYSTEMS:    CONSTITUTIONAL: No weakness, fevers or chills  EYES/ENT: No visual changes;  No vertigo or throat pain   NECK: No pain or stiffness  RESPIRATORY: No cough, wheezing, hemoptysis; No shortness of breath  CARDIOVASCULAR: No chest pain or palpitations  GASTROINTESTINAL: No abdominal or epigastric pain. No nausea, vomiting, or hematemesis; No diarrhea or constipation. No melena or hematochezia.  GENITOURINARY: No dysuria, frequency or hematuria  NEUROLOGICAL: No numbness or weakness  SKIN: No itching, rashes    ICU Vital Signs Last 24 Hrs  T(C): 36.7 (17 Sep 2022 12:00), Max: 36.9 (16 Sep 2022 20:00)  T(F): 98 (17 Sep 2022 12:00), Max: 98.5 (16 Sep 2022 20:00)  HR: 74 (17 Sep 2022 12:00) (68 - 92)  BP: 116/83 (17 Sep 2022 12:00) (92/66 - 144/83)  BP(mean): 96 (17 Sep 2022 12:00) (73 - 109)  ABP: --  ABP(mean): --  RR: 18 (17 Sep 2022 12:00) (16 - 22)  SpO2: 96% (17 Sep 2022 12:00) (91% - 100%)    O2 Parameters below as of 17 Sep 2022 12:00  Patient On (Oxygen Delivery Method): room air    16 Sep 2022 07:01  -  17 Sep 2022 07:00  --------------------------------------------------------  IN: 960 mL / OUT: 1350 mL / NET: -390 mL    CAPILLARY BLOOD GLUCOSE    POCT Blood Glucose.: 183 mg/dL (17 Sep 2022 10:04)    PHYSICAL EXAM:  GENERAL: No acute distress, well-developed  EYES: EOMI, PERRLA, conjunctiva and sclera clear  NECK: Supple, no lymphadenopathy, no JVD  CHEST/LUNG: CTAB; No wheezes, rales, or rhonchi  HEART: Regular rate and rhythm. Normal S1/S2. No murmurs, rubs, or gallops  ABDOMEN: Soft, non-tender, non-distended; normal bowel sounds, no organomegaly  EXTREMITIES:  2+ peripheral pulses b/l, No clubbing, cyanosis, or edema  NEUROLOGY: A&O x 3, no focal deficits    ============================I/O===========================   I&O's Detail    16 Sep 2022 07:01  -  17 Sep 2022 07:00  --------------------------------------------------------  IN:    Oral Fluid: 960 mL  Total IN: 960 mL    OUT:    Voided (mL): 1350 mL  Total OUT: 1350 mL    Total NET: -390 mL    ============================ LABS =========================                        14.6   9.61  )-----------( 309      ( 17 Sep 2022 03:42 )             44.6     09-17    138  |  103  |  22  ----------------------------<  155<H>  4.0   |  23  |  0.69    Ca    10.2      17 Sep 2022 03:42  Phos  3.4     09-17  Mg     2.1     09-17    TPro  7.6  /  Alb  4.4  /  TBili  0.7  /  DBili  x   /  AST  57<H>  /  ALT  27  /  AlkPhos  76  09-17    Troponin T, High Sensitivity Result: 1233 ng/L (09-17-22 @ 03:42)  Troponin T, High Sensitivity Result: 1401 ng/L (09-16-22 @ 11:02)  Troponin T, High Sensitivity Result: 2683 ng/L (09-16-22 @ 03:57)  Troponin T, High Sensitivity Result: 1103 ng/L (09-15-22 @ 20:08)    CKMB Units: 22.2 ng/mL (09-17-22 @ 03:42)  CKMB Units: 69.5 ng/mL (09-16-22 @ 11:02)  CKMB Units: 112.4 ng/mL (09-16-22 @ 03:57)  CKMB Units: 80.5 ng/mL (09-15-22 @ 20:08)    Creatine Kinase, Serum: 310 U/L (09-17-22 @ 03:42)  Creatine Kinase, Serum: 763 U/L (09-16-22 @ 11:02)  Creatine Kinase, Serum: 1100 U/L (09-16-22 @ 03:57)  Creatine Kinase, Serum: 740 U/L (09-15-22 @ 20:08)    CPK Mass Assay %: 7.2 % (09-17-22 @ 03:42)  CPK Mass Assay %: 9.1 % (09-16-22 @ 11:02)  CPK Mass Assay %: 10.2 % (09-16-22 @ 03:57)  CPK Mass Assay %: 10.9 % (09-15-22 @ 20:08)    LIVER FUNCTIONS - ( 17 Sep 2022 03:42 )  Alb: 4.4 g/dL / Pro: 7.6 g/dL / ALK PHOS: 76 U/L / ALT: 27 U/L / AST: 57 U/L / GGT: x           PT/INR - ( 16 Sep 2022 03:57 )   PT: 11.7 sec;   INR: 1.01 ratio         PTT - ( 16 Sep 2022 03:57 )  PTT:32.4 sec    Lactate, Blood: 1.8 mmol/L (09-17-22 @ 03:42)  Lactate, Blood: 1.6 mmol/L (09-16-22 @ 03:57)  Lactate, Blood: 1.7 mmol/L (09-15-22 @ 20:08)    ======================================================  PAST MEDICAL & SURGICAL HISTORY:  HTN (hypertension)      DM (diabetes mellitus)      Hyperlipidemia      History of cholecystectomy

## 2022-09-20 PROCEDURE — 83880 ASSAY OF NATRIURETIC PEPTIDE: CPT

## 2022-09-20 PROCEDURE — 85730 THROMBOPLASTIN TIME PARTIAL: CPT

## 2022-09-20 PROCEDURE — 93005 ELECTROCARDIOGRAM TRACING: CPT

## 2022-09-20 PROCEDURE — 96375 TX/PRO/DX INJ NEW DRUG ADDON: CPT

## 2022-09-20 PROCEDURE — 80053 COMPREHEN METABOLIC PANEL: CPT

## 2022-09-20 PROCEDURE — 87635 SARS-COV-2 COVID-19 AMP PRB: CPT

## 2022-09-20 PROCEDURE — 85027 COMPLETE CBC AUTOMATED: CPT

## 2022-09-20 PROCEDURE — 96374 THER/PROPH/DIAG INJ IV PUSH: CPT

## 2022-09-20 PROCEDURE — 84484 ASSAY OF TROPONIN QUANT: CPT

## 2022-09-20 PROCEDURE — 83690 ASSAY OF LIPASE: CPT

## 2022-09-20 PROCEDURE — 83735 ASSAY OF MAGNESIUM: CPT

## 2022-09-20 PROCEDURE — 99285 EMERGENCY DEPT VISIT HI MDM: CPT | Mod: 25

## 2022-09-20 PROCEDURE — 82962 GLUCOSE BLOOD TEST: CPT

## 2022-09-20 PROCEDURE — 80048 BASIC METABOLIC PNL TOTAL CA: CPT

## 2022-09-20 PROCEDURE — 85610 PROTHROMBIN TIME: CPT

## 2022-09-20 PROCEDURE — 85025 COMPLETE CBC W/AUTO DIFF WBC: CPT

## 2022-09-20 PROCEDURE — 36415 COLL VENOUS BLD VENIPUNCTURE: CPT

## 2022-09-20 PROCEDURE — 71046 X-RAY EXAM CHEST 2 VIEWS: CPT

## 2022-09-20 PROCEDURE — 80061 LIPID PANEL: CPT

## 2023-12-18 ENCOUNTER — EMERGENCY (EMERGENCY)
Facility: HOSPITAL | Age: 63
LOS: 1 days | Discharge: HOME | End: 2023-12-18
Attending: STUDENT IN AN ORGANIZED HEALTH CARE EDUCATION/TRAINING PROGRAM
Payer: MEDICAID

## 2023-12-18 VITALS
TEMPERATURE: 98 F | SYSTOLIC BLOOD PRESSURE: 133 MMHG | RESPIRATION RATE: 18 BRPM | HEIGHT: 61 IN | WEIGHT: 130.07 LBS | OXYGEN SATURATION: 97 % | DIASTOLIC BLOOD PRESSURE: 84 MMHG | HEART RATE: 73 BPM

## 2023-12-18 DIAGNOSIS — Z90.49 ACQUIRED ABSENCE OF OTHER SPECIFIED PARTS OF DIGESTIVE TRACT: Chronic | ICD-10-CM

## 2023-12-18 PROCEDURE — 99285 EMERGENCY DEPT VISIT HI MDM: CPT

## 2023-12-18 RX ORDER — ACETAMINOPHEN 500 MG
650 TABLET ORAL ONCE
Refills: 0 | Status: COMPLETED | OUTPATIENT
Start: 2023-12-18 | End: 2023-12-18

## 2023-12-18 NOTE — ED ADULT NURSE NOTE - CHIEF COMPLAINT QUOTE
pt stated today she walking  and she got dizzy "like the whole world was moving' and fell forward hitting her head denies loc this occurred around 2:30pm this afternoon bruise noted to forehead, pt c/o headache and she is on blood thinners.

## 2023-12-18 NOTE — ED PROVIDER NOTE - PROGRESS NOTE DETAILS
patient signed out to me by Dr. Roca pending CT. no emergent findings. patient still having dizziness. will treat. Mark Omalley symptoms improved. reports she has medication for vertigo at home. stable for outpatinet followup. Mark Omalley

## 2023-12-18 NOTE — ED ADULT NURSE NOTE - OBJECTIVE STATEMENT
Pt presents to the ED for dizziness. Pt states that she feels as though the room is spinning. Pt denies weakness or unsteady gait.

## 2023-12-18 NOTE — ED PROVIDER NOTE - DATE/TIME 1
19-Dec-2023 02:00 Eucrisa Counseling: Patient may experience a mild burning sensation during topical application. Eucrisa is not approved in children less than 2 years of age.

## 2023-12-18 NOTE — ED PROVIDER NOTE - OBJECTIVE STATEMENT
63-year old female with past medical history CAD status post stent on aspirin, hypertension, hyperlipidemia, diabetes, vertigo presents status post fall today.  Patient reports intermittent episodes of dizziness secondary to vertigo, provoked with head movement and position change.  Patient states she had episode of dizziness described as room spinning sensation today, states she fell forward and hit her head on the ground.  Denies LOC, patient amatory afterwards.  Patient reports bilateral frontal headache.  Denies any fevers, chest pain, neck pain, shortness of breath, palpitations, abdominal pain, vomiting, vision change, numbness, weakness, difficulty speaking, difficulty walking, or rash.  Contrary to triage note, denies anticoagulation use.  Denies any dizziness at this time.  Denies additional complaints.

## 2023-12-18 NOTE — ED PROVIDER NOTE - NSFOLLOWUPINSTRUCTIONS_ED_ALL_ED_FT
You were seen in the emergency department for dizziness.     Please follow-up with your primary care doctor within the next 24-48 hours.    If you have any worsening symptoms, severe headache, chest pain, trouble breathing, please return to the emergency department.

## 2023-12-18 NOTE — ED PROVIDER NOTE - PATIENT PORTAL LINK FT
You can access the FollowMyHealth Patient Portal offered by [facility] by registering at the following website: [email or web address]. By joining [employer]’s FollowMyHealth portal, you will also be able to view your health information using other applications (apps) compatible with our system. You can access the FollowMyHealth Patient Portal offered by [facility] by registering at the following website: [email or web address]. By joining [employer]’s FollowMyHealth portal, you will also be able to view your health information using other applications (apps) compatible with our system.

## 2023-12-18 NOTE — ED ADULT NURSE NOTE - NSFALLHARMRISKINTERV_ED_ALL_ED
Communicate risk of Fall with Harm to all staff, patient, and family/Provide visual cue: red socks, yellow wristband, yellow gown, etc/Reinforce activity limits and safety measures with patient and family/Bed in lowest position, wheels locked, appropriate side rails in place/Call bell, personal items and telephone in reach/Instruct patient to call for assistance before getting out of bed/chair/stretcher/Non-slip footwear applied when patient is off stretcher/[place] to call system/Physically safe environment - no spills, clutter or unnecessary equipment/Purposeful Proactive Rounding/Room/bathroom lighting operational, light cord in reach Communicate risk of Fall with Harm to all staff, patient, and family/Provide visual cue: red socks, yellow wristband, yellow gown, etc/Reinforce activity limits and safety measures with patient and family/Bed in lowest position, wheels locked, appropriate side rails in place/Call bell, personal items and telephone in reach/Instruct patient to call for assistance before getting out of bed/chair/stretcher/Non-slip footwear applied when patient is off stretcher/[place] to call system/Physically safe environment - no spills, clutter or unnecessary equipment/Purposeful Proactive Rounding/Room/bathroom lighting operational, light cord in reach

## 2023-12-18 NOTE — ED PROVIDER NOTE - CLINICAL SUMMARY MEDICAL DECISION MAKING FREE TEXT BOX
Danisha: 63-year old female with past medical history CAD status post stent on aspirin, hypertension, hyperlipidemia, diabetes, vertigo presents status post fall today.  Patient reports intermittent episodes of dizziness secondary to vertigo, provoked with head movement and position change.  Patient states she had episode of dizziness described as room spinning sensation today, states she fell forward and hit her head on the ground.  Denies LOC, patient amatory afterwards.  Patient reports bilateral frontal headache.  Denies any fevers, chest pain, neck pain, shortness of breath, palpitations, abdominal pain, vomiting, vision change, numbness, weakness, difficulty speaking, difficulty walking, or rash.  Contrary to triage note, denies anticoagulation use.  Denies any dizziness at this time.  Physical exam per above. Neuro exam nonfocal, dizziness intermittent/with movement, not consistent with CVA. Will obtain labs, imaging, provide supportive treatment with dispo pending workup.

## 2023-12-19 VITALS
OXYGEN SATURATION: 98 % | TEMPERATURE: 98 F | DIASTOLIC BLOOD PRESSURE: 91 MMHG | RESPIRATION RATE: 18 BRPM | SYSTOLIC BLOOD PRESSURE: 150 MMHG | HEART RATE: 70 BPM

## 2023-12-19 LAB
ALBUMIN SERPL ELPH-MCNC: 3.7 G/DL — SIGNIFICANT CHANGE UP (ref 3.5–5)
ALBUMIN SERPL ELPH-MCNC: 3.7 G/DL — SIGNIFICANT CHANGE UP (ref 3.5–5)
ALP SERPL-CCNC: 96 U/L — SIGNIFICANT CHANGE UP (ref 40–120)
ALP SERPL-CCNC: 96 U/L — SIGNIFICANT CHANGE UP (ref 40–120)
ALT FLD-CCNC: 35 U/L DA — SIGNIFICANT CHANGE UP (ref 10–60)
ALT FLD-CCNC: 35 U/L DA — SIGNIFICANT CHANGE UP (ref 10–60)
ANION GAP SERPL CALC-SCNC: 4 MMOL/L — LOW (ref 5–17)
ANION GAP SERPL CALC-SCNC: 4 MMOL/L — LOW (ref 5–17)
AST SERPL-CCNC: 20 U/L — SIGNIFICANT CHANGE UP (ref 10–40)
AST SERPL-CCNC: 20 U/L — SIGNIFICANT CHANGE UP (ref 10–40)
BASOPHILS # BLD AUTO: 0.05 K/UL — SIGNIFICANT CHANGE UP (ref 0–0.2)
BASOPHILS # BLD AUTO: 0.05 K/UL — SIGNIFICANT CHANGE UP (ref 0–0.2)
BASOPHILS NFR BLD AUTO: 0.7 % — SIGNIFICANT CHANGE UP (ref 0–2)
BASOPHILS NFR BLD AUTO: 0.7 % — SIGNIFICANT CHANGE UP (ref 0–2)
BILIRUB SERPL-MCNC: 0.7 MG/DL — SIGNIFICANT CHANGE UP (ref 0.2–1.2)
BILIRUB SERPL-MCNC: 0.7 MG/DL — SIGNIFICANT CHANGE UP (ref 0.2–1.2)
BUN SERPL-MCNC: 16 MG/DL — SIGNIFICANT CHANGE UP (ref 7–18)
BUN SERPL-MCNC: 16 MG/DL — SIGNIFICANT CHANGE UP (ref 7–18)
CALCIUM SERPL-MCNC: 9.7 MG/DL — SIGNIFICANT CHANGE UP (ref 8.4–10.5)
CALCIUM SERPL-MCNC: 9.7 MG/DL — SIGNIFICANT CHANGE UP (ref 8.4–10.5)
CHLORIDE SERPL-SCNC: 109 MMOL/L — HIGH (ref 96–108)
CHLORIDE SERPL-SCNC: 109 MMOL/L — HIGH (ref 96–108)
CO2 SERPL-SCNC: 28 MMOL/L — SIGNIFICANT CHANGE UP (ref 22–31)
CO2 SERPL-SCNC: 28 MMOL/L — SIGNIFICANT CHANGE UP (ref 22–31)
CREAT SERPL-MCNC: 0.68 MG/DL — SIGNIFICANT CHANGE UP (ref 0.5–1.3)
CREAT SERPL-MCNC: 0.68 MG/DL — SIGNIFICANT CHANGE UP (ref 0.5–1.3)
EGFR: 99 ML/MIN/1.73M2 — SIGNIFICANT CHANGE UP
EGFR: 99 ML/MIN/1.73M2 — SIGNIFICANT CHANGE UP
EOSINOPHIL # BLD AUTO: 0.23 K/UL — SIGNIFICANT CHANGE UP (ref 0–0.5)
EOSINOPHIL # BLD AUTO: 0.23 K/UL — SIGNIFICANT CHANGE UP (ref 0–0.5)
EOSINOPHIL NFR BLD AUTO: 3 % — SIGNIFICANT CHANGE UP (ref 0–6)
EOSINOPHIL NFR BLD AUTO: 3 % — SIGNIFICANT CHANGE UP (ref 0–6)
GLUCOSE SERPL-MCNC: 89 MG/DL — SIGNIFICANT CHANGE UP (ref 70–99)
GLUCOSE SERPL-MCNC: 89 MG/DL — SIGNIFICANT CHANGE UP (ref 70–99)
HCT VFR BLD CALC: 37.3 % — SIGNIFICANT CHANGE UP (ref 34.5–45)
HCT VFR BLD CALC: 37.3 % — SIGNIFICANT CHANGE UP (ref 34.5–45)
HGB BLD-MCNC: 12.1 G/DL — SIGNIFICANT CHANGE UP (ref 11.5–15.5)
HGB BLD-MCNC: 12.1 G/DL — SIGNIFICANT CHANGE UP (ref 11.5–15.5)
IMM GRANULOCYTES NFR BLD AUTO: 0.1 % — SIGNIFICANT CHANGE UP (ref 0–0.9)
IMM GRANULOCYTES NFR BLD AUTO: 0.1 % — SIGNIFICANT CHANGE UP (ref 0–0.9)
LYMPHOCYTES # BLD AUTO: 3.48 K/UL — HIGH (ref 1–3.3)
LYMPHOCYTES # BLD AUTO: 3.48 K/UL — HIGH (ref 1–3.3)
LYMPHOCYTES # BLD AUTO: 45.7 % — HIGH (ref 13–44)
LYMPHOCYTES # BLD AUTO: 45.7 % — HIGH (ref 13–44)
MCHC RBC-ENTMCNC: 28.1 PG — SIGNIFICANT CHANGE UP (ref 27–34)
MCHC RBC-ENTMCNC: 28.1 PG — SIGNIFICANT CHANGE UP (ref 27–34)
MCHC RBC-ENTMCNC: 32.4 GM/DL — SIGNIFICANT CHANGE UP (ref 32–36)
MCHC RBC-ENTMCNC: 32.4 GM/DL — SIGNIFICANT CHANGE UP (ref 32–36)
MCV RBC AUTO: 86.5 FL — SIGNIFICANT CHANGE UP (ref 80–100)
MCV RBC AUTO: 86.5 FL — SIGNIFICANT CHANGE UP (ref 80–100)
MONOCYTES # BLD AUTO: 0.61 K/UL — SIGNIFICANT CHANGE UP (ref 0–0.9)
MONOCYTES # BLD AUTO: 0.61 K/UL — SIGNIFICANT CHANGE UP (ref 0–0.9)
MONOCYTES NFR BLD AUTO: 8 % — SIGNIFICANT CHANGE UP (ref 2–14)
MONOCYTES NFR BLD AUTO: 8 % — SIGNIFICANT CHANGE UP (ref 2–14)
NEUTROPHILS # BLD AUTO: 3.24 K/UL — SIGNIFICANT CHANGE UP (ref 1.8–7.4)
NEUTROPHILS # BLD AUTO: 3.24 K/UL — SIGNIFICANT CHANGE UP (ref 1.8–7.4)
NEUTROPHILS NFR BLD AUTO: 42.5 % — LOW (ref 43–77)
NEUTROPHILS NFR BLD AUTO: 42.5 % — LOW (ref 43–77)
NRBC # BLD: 0 /100 WBCS — SIGNIFICANT CHANGE UP (ref 0–0)
NRBC # BLD: 0 /100 WBCS — SIGNIFICANT CHANGE UP (ref 0–0)
PLATELET # BLD AUTO: 290 K/UL — SIGNIFICANT CHANGE UP (ref 150–400)
PLATELET # BLD AUTO: 290 K/UL — SIGNIFICANT CHANGE UP (ref 150–400)
POTASSIUM SERPL-MCNC: 3.6 MMOL/L — SIGNIFICANT CHANGE UP (ref 3.5–5.3)
POTASSIUM SERPL-MCNC: 3.6 MMOL/L — SIGNIFICANT CHANGE UP (ref 3.5–5.3)
POTASSIUM SERPL-SCNC: 3.6 MMOL/L — SIGNIFICANT CHANGE UP (ref 3.5–5.3)
POTASSIUM SERPL-SCNC: 3.6 MMOL/L — SIGNIFICANT CHANGE UP (ref 3.5–5.3)
PROT SERPL-MCNC: 7.4 G/DL — SIGNIFICANT CHANGE UP (ref 6–8.3)
PROT SERPL-MCNC: 7.4 G/DL — SIGNIFICANT CHANGE UP (ref 6–8.3)
RBC # BLD: 4.31 M/UL — SIGNIFICANT CHANGE UP (ref 3.8–5.2)
RBC # BLD: 4.31 M/UL — SIGNIFICANT CHANGE UP (ref 3.8–5.2)
RBC # FLD: 12.6 % — SIGNIFICANT CHANGE UP (ref 10.3–14.5)
RBC # FLD: 12.6 % — SIGNIFICANT CHANGE UP (ref 10.3–14.5)
SODIUM SERPL-SCNC: 141 MMOL/L — SIGNIFICANT CHANGE UP (ref 135–145)
SODIUM SERPL-SCNC: 141 MMOL/L — SIGNIFICANT CHANGE UP (ref 135–145)
TROPONIN I, HIGH SENSITIVITY RESULT: 14.6 NG/L — SIGNIFICANT CHANGE UP
TROPONIN I, HIGH SENSITIVITY RESULT: 14.6 NG/L — SIGNIFICANT CHANGE UP
WBC # BLD: 7.62 K/UL — SIGNIFICANT CHANGE UP (ref 3.8–10.5)
WBC # BLD: 7.62 K/UL — SIGNIFICANT CHANGE UP (ref 3.8–10.5)
WBC # FLD AUTO: 7.62 K/UL — SIGNIFICANT CHANGE UP (ref 3.8–10.5)
WBC # FLD AUTO: 7.62 K/UL — SIGNIFICANT CHANGE UP (ref 3.8–10.5)

## 2023-12-19 PROCEDURE — 84484 ASSAY OF TROPONIN QUANT: CPT

## 2023-12-19 PROCEDURE — 85025 COMPLETE CBC W/AUTO DIFF WBC: CPT

## 2023-12-19 PROCEDURE — 99285 EMERGENCY DEPT VISIT HI MDM: CPT | Mod: 25

## 2023-12-19 PROCEDURE — 70450 CT HEAD/BRAIN W/O DYE: CPT | Mod: 26,MA

## 2023-12-19 PROCEDURE — 96374 THER/PROPH/DIAG INJ IV PUSH: CPT

## 2023-12-19 PROCEDURE — 80053 COMPREHEN METABOLIC PANEL: CPT

## 2023-12-19 PROCEDURE — 36415 COLL VENOUS BLD VENIPUNCTURE: CPT

## 2023-12-19 PROCEDURE — 93005 ELECTROCARDIOGRAM TRACING: CPT

## 2023-12-19 PROCEDURE — 70450 CT HEAD/BRAIN W/O DYE: CPT | Mod: MA

## 2023-12-19 RX ORDER — METOCLOPRAMIDE HCL 10 MG
10 TABLET ORAL ONCE
Refills: 0 | Status: COMPLETED | OUTPATIENT
Start: 2023-12-19 | End: 2023-12-19

## 2023-12-19 RX ORDER — SODIUM CHLORIDE 9 MG/ML
1000 INJECTION INTRAMUSCULAR; INTRAVENOUS; SUBCUTANEOUS ONCE
Refills: 0 | Status: COMPLETED | OUTPATIENT
Start: 2023-12-19 | End: 2023-12-19

## 2023-12-19 RX ORDER — MECLIZINE HCL 12.5 MG
25 TABLET ORAL ONCE
Refills: 0 | Status: COMPLETED | OUTPATIENT
Start: 2023-12-19 | End: 2023-12-19

## 2023-12-19 RX ADMIN — Medication 25 MILLIGRAM(S): at 02:35

## 2023-12-19 RX ADMIN — Medication 10 MILLIGRAM(S): at 02:35

## 2023-12-19 RX ADMIN — SODIUM CHLORIDE 1000 MILLILITER(S): 9 INJECTION INTRAMUSCULAR; INTRAVENOUS; SUBCUTANEOUS at 02:35

## 2024-02-09 NOTE — ED ADULT TRIAGE NOTE - CHIEF COMPLAINT QUOTE
pt stated today she walking  and she got dizzy "like the whole world was moving' and fell forward hitting her head denies loc this occurred around 2:30pm this afternoon bruise noted to forehead, pt c/o headache and she is on blood thinners. no

## 2024-11-11 PROBLEM — Z00.00 ENCOUNTER FOR PREVENTIVE HEALTH EXAMINATION: Status: ACTIVE | Noted: 2024-11-11

## 2024-11-12 ENCOUNTER — APPOINTMENT (OUTPATIENT)
Dept: HEMATOLOGY ONCOLOGY | Facility: CLINIC | Age: 64
End: 2024-11-12

## 2024-12-09 ENCOUNTER — NON-APPOINTMENT (OUTPATIENT)
Age: 64
End: 2024-12-09

## 2024-12-12 PROBLEM — R19.00 PELVIC MASS IN FEMALE: Status: ACTIVE | Noted: 2024-12-12

## 2024-12-13 ENCOUNTER — APPOINTMENT (OUTPATIENT)
Dept: GYNECOLOGIC ONCOLOGY | Facility: CLINIC | Age: 64
End: 2024-12-13
Payer: MEDICAID

## 2024-12-13 ENCOUNTER — NON-APPOINTMENT (OUTPATIENT)
Age: 64
End: 2024-12-13

## 2024-12-13 VITALS
HEART RATE: 71 BPM | BODY MASS INDEX: 25.03 KG/M2 | OXYGEN SATURATION: 98 % | WEIGHT: 136 LBS | HEIGHT: 62 IN | TEMPERATURE: 98.4 F | DIASTOLIC BLOOD PRESSURE: 72 MMHG | SYSTOLIC BLOOD PRESSURE: 106 MMHG

## 2024-12-13 DIAGNOSIS — R19.00 INTRA-ABDOMINAL AND PELVIC SWELLING, MASS AND LUMP, UNSPECIFIED SITE: ICD-10-CM

## 2024-12-13 DIAGNOSIS — Z78.9 OTHER SPECIFIED HEALTH STATUS: ICD-10-CM

## 2024-12-13 DIAGNOSIS — Z80.9 FAMILY HISTORY OF MALIGNANT NEOPLASM, UNSPECIFIED: ICD-10-CM

## 2024-12-13 DIAGNOSIS — Z86.79 PERSONAL HISTORY OF OTHER DISEASES OF THE CIRCULATORY SYSTEM: ICD-10-CM

## 2024-12-13 DIAGNOSIS — Z86.39 PERSONAL HISTORY OF OTHER ENDOCRINE, NUTRITIONAL AND METABOLIC DISEASE: ICD-10-CM

## 2024-12-13 DIAGNOSIS — Z56.0 UNEMPLOYMENT, UNSPECIFIED: ICD-10-CM

## 2024-12-13 PROCEDURE — 99459 PELVIC EXAMINATION: CPT

## 2024-12-13 PROCEDURE — 99205 OFFICE O/P NEW HI 60 MIN: CPT

## 2024-12-13 SDOH — ECONOMIC STABILITY - INCOME SECURITY: UNEMPLOYMENT, UNSPECIFIED: Z56.0

## 2024-12-15 PROBLEM — Z56.0 UNEMPLOYED: Status: ACTIVE | Noted: 2024-12-13

## 2024-12-15 PROBLEM — Z86.79 HISTORY OF CORONARY ARTERY DISEASE: Status: RESOLVED | Noted: 2024-12-15 | Resolved: 2024-12-15

## 2024-12-15 PROBLEM — Z86.39 HISTORY OF TYPE 2 DIABETES MELLITUS: Status: RESOLVED | Noted: 2024-12-15 | Resolved: 2024-12-15

## 2024-12-15 PROBLEM — Z78.9 SOCIAL ALCOHOL USE: Status: ACTIVE | Noted: 2024-12-15

## 2024-12-15 PROBLEM — Z86.39 HISTORY OF HYPERLIPIDEMIA: Status: RESOLVED | Noted: 2024-12-15 | Resolved: 2024-12-15

## 2024-12-15 PROBLEM — Z78.9 NON-SMOKER: Status: ACTIVE | Noted: 2024-12-13

## 2024-12-15 PROBLEM — Z80.9 FAMILY HISTORY OF MALIGNANT NEOPLASM: Status: ACTIVE | Noted: 2024-12-15

## 2024-12-15 PROBLEM — Z86.79 HISTORY OF HYPERTENSION: Status: RESOLVED | Noted: 2024-12-15 | Resolved: 2024-12-15

## 2024-12-15 RX ORDER — ASPIRIN 81 MG
81 TABLET, DELAYED RELEASE (ENTERIC COATED) ORAL
Refills: 0 | Status: ACTIVE | COMMUNITY

## 2024-12-15 RX ORDER — GLYBURIDE 5 MG/1
5 TABLET ORAL
Refills: 0 | Status: ACTIVE | COMMUNITY

## 2024-12-15 RX ORDER — AMLODIPINE BESYLATE 10 MG/1
10 TABLET ORAL
Refills: 0 | Status: ACTIVE | COMMUNITY

## 2024-12-15 RX ORDER — METOPROLOL TARTRATE 25 MG/1
25 TABLET ORAL
Refills: 0 | Status: ACTIVE | COMMUNITY

## 2024-12-15 RX ORDER — ATORVASTATIN CALCIUM 80 MG/1
80 TABLET, FILM COATED ORAL
Refills: 0 | Status: ACTIVE | COMMUNITY

## 2024-12-15 RX ORDER — LOSARTAN POTASSIUM 25 MG/1
25 TABLET, FILM COATED ORAL
Refills: 0 | Status: ACTIVE | COMMUNITY

## 2024-12-15 RX ORDER — METFORMIN HYDROCHLORIDE 1000 MG/1
1000 TABLET, COATED ORAL
Refills: 0 | Status: ACTIVE | COMMUNITY

## 2024-12-31 ENCOUNTER — APPOINTMENT (OUTPATIENT)
Dept: MRI IMAGING | Facility: CLINIC | Age: 64
End: 2024-12-31
Payer: MEDICAID

## 2024-12-31 PROCEDURE — 72197 MRI PELVIS W/O & W/DYE: CPT

## 2024-12-31 PROCEDURE — A9585: CPT | Mod: JW

## 2025-01-14 ENCOUNTER — APPOINTMENT (OUTPATIENT)
Dept: GYNECOLOGIC ONCOLOGY | Facility: CLINIC | Age: 65
End: 2025-01-14
Payer: MEDICAID

## 2025-01-14 PROCEDURE — 99212 OFFICE O/P EST SF 10 MIN: CPT | Mod: 93

## 2025-01-28 ENCOUNTER — OUTPATIENT (OUTPATIENT)
Dept: OUTPATIENT SERVICES | Facility: HOSPITAL | Age: 65
LOS: 1 days | End: 2025-01-28
Payer: MEDICAID

## 2025-01-28 VITALS
OXYGEN SATURATION: 97 % | DIASTOLIC BLOOD PRESSURE: 79 MMHG | SYSTOLIC BLOOD PRESSURE: 113 MMHG | RESPIRATION RATE: 18 BRPM | HEART RATE: 80 BPM | TEMPERATURE: 97 F | HEIGHT: 61 IN | WEIGHT: 134.92 LBS

## 2025-01-28 DIAGNOSIS — Z98.890 OTHER SPECIFIED POSTPROCEDURAL STATES: Chronic | ICD-10-CM

## 2025-01-28 DIAGNOSIS — E78.5 HYPERLIPIDEMIA, UNSPECIFIED: ICD-10-CM

## 2025-01-28 DIAGNOSIS — I25.10 ATHEROSCLEROTIC HEART DISEASE OF NATIVE CORONARY ARTERY WITHOUT ANGINA PECTORIS: ICD-10-CM

## 2025-01-28 DIAGNOSIS — Z90.49 ACQUIRED ABSENCE OF OTHER SPECIFIED PARTS OF DIGESTIVE TRACT: Chronic | ICD-10-CM

## 2025-01-28 DIAGNOSIS — K21.9 GASTRO-ESOPHAGEAL REFLUX DISEASE WITHOUT ESOPHAGITIS: ICD-10-CM

## 2025-01-28 DIAGNOSIS — R19.00 INTRA-ABDOMINAL AND PELVIC SWELLING, MASS AND LUMP, UNSPECIFIED SITE: ICD-10-CM

## 2025-01-28 DIAGNOSIS — Z95.5 PRESENCE OF CORONARY ANGIOPLASTY IMPLANT AND GRAFT: Chronic | ICD-10-CM

## 2025-01-28 DIAGNOSIS — E11.9 TYPE 2 DIABETES MELLITUS WITHOUT COMPLICATIONS: ICD-10-CM

## 2025-01-28 DIAGNOSIS — Z01.818 ENCOUNTER FOR OTHER PREPROCEDURAL EXAMINATION: ICD-10-CM

## 2025-01-28 DIAGNOSIS — I10 ESSENTIAL (PRIMARY) HYPERTENSION: ICD-10-CM

## 2025-01-28 LAB — BLD GP AB SCN SERPL QL: SIGNIFICANT CHANGE UP

## 2025-01-28 NOTE — H&P PST ADULT - GENITOURINARY COMMENTS
Pt had to reschedule her appointment today for February. She wants to know if you will call in Rx Phentermine for her.   c/o right ovarian cyst not examined

## 2025-01-28 NOTE — H&P PST ADULT - NSICDXFAMILYHX_GEN_ALL_CORE_FT
FAMILY HISTORY:  Sibling  Still living? No  Family history of ovarian cancer, Age at diagnosis: Age Unknown  Family history of prostate cancer, Age at diagnosis: Age Unknown  FH: brain aneurysm, Age at diagnosis: Age Unknown

## 2025-01-28 NOTE — H&P PST ADULT - NSICDXPASTSURGICALHX_GEN_ALL_CORE_FT
PAST SURGICAL HISTORY:  History of cardiac catheterization     History of cholecystectomy     History of coronary artery stent placement

## 2025-01-28 NOTE — H&P PST ADULT - DOES PATIENT HAVE ADVANCE DIRECTIVE
Pt's son Yonny Hidalgosundar [phone] and friend Lainey Mcintyre [phone] will make decisions in case of emergency/No

## 2025-01-28 NOTE — H&P PST ADULT - ATTENDING COMMENTS
Patient seen in [facility], no changes reported. Consent form reviewed, including examination by learner and possible staging debulking. Case reviewed with circulating OR team. All questions answered.    Brandee Shen MD

## 2025-01-28 NOTE — H&P PST ADULT - NSICDXPASTMEDICALHX_GEN_ALL_CORE_FT
PAST MEDICAL HISTORY:  CAD (coronary artery disease)     DM (diabetes mellitus)     HTN (hypertension)     Hyperlipidemia      PAST MEDICAL HISTORY:  CAD (coronary artery disease)     DM (diabetes mellitus)     GERD (gastroesophageal reflux disease)     HTN (hypertension)     Hyperlipidemia

## 2025-01-28 NOTE — H&P PST ADULT - PROBLEM SELECTOR PLAN 2
Pt with CAD on aspirin, STEMI-s/p TIA to midLAD on 9/15/2022.  Pt instructed not to stop aspirin, to continue taking aspirin and to take aspirin on the morning of surgery.   Pt to follow-up with cardiologist for optimization for procedure and management.

## 2025-01-28 NOTE — H&P PST ADULT - PROBLEM SELECTOR PLAN 3
HgA1C 6.7.  Instructed to continue antidiabetic meds and to hold them on the morning of surgery.   Perioperative glucose monitoring and coverage as needed.  Follow-up with PCP for diabetic management

## 2025-01-28 NOTE — H&P PST ADULT - NSANTHOSAYNRD_GEN_A_CORE
No. CLARISSE screening performed.  STOP BANG Legend: 0-2 = LOW Risk; 3-4 = INTERMEDIATE Risk; 5-8 = HIGH Risk

## 2025-01-28 NOTE — H&P PST ADULT - NSICDXPROCEDURE_GEN_ALL_CORE_FT
PROCEDURES:  Robot-assisted bilateral salpingo-oophorectomy 28-Jan-2025 19:20:35  Savanah Philip  Hysteroscopic sampling of endometrium with dilation and curettage 28-Jan-2025 19:21:28  Savanah Philip

## 2025-01-28 NOTE — H&P PST ADULT - HISTORY OF PRESENT ILLNESS
This is a 64 yr old female with PMH of CAD on aspirin, STEMI-s/p TIA to midLAD on 9/15/2022, HTN, HLD, Diabetes on coronary stents who presents with c/o prolonged and heavy menses due to endometrial polyp. Pt for dilation and curettage, hysteroscopy on  This is a 64 yr old female with PMH of CAD on aspirin, STEMI-s/p TIA to midLAD on 9/15/2022, HTN, HLD, Diabetes on Metformin and Glipizide , GERD who presents with intra-abdominal and pelvic swelling mass and lump. . Pt for robotic assisted bilateral salpingo-oophorectomy with dilation and curettage, possible staging on 2/6/2025.  This is a 64 yr old female with PMH of CAD on aspirin, STEMI-s/p TIA to midLAD on 9/15/2022, HTN, HLD, Diabetes-Hemoglobin A1C 6.7 on Metformin and Glipizide , GERD who presents with intra-abdominal and pelvic swelling mass and lump. Pt is scheduled for robotic assisted bilateral salpingo-oophorectomy with dilation and curettage, possible staging on 2/6/2025.

## 2025-01-28 NOTE — H&P PST ADULT - ASSESSMENT
This is a 64 yr old female with PMH of CAD on aspirin, STEMI-s/p TIA to midLAD on 9/15/2022, HTN, HLD, Diabetes on Metformin and Glipizide , GERD who presents with intra-abdominal and pelvic swelling mass and lump. Pt is scheduled for robotic assisted bilateral salpingo-oophorectomy with dilation and curettage, possible staging on 2/6/2025.  CLARISSE stop bang score 2. Pt denies history of CLARISSE, never did sleep study.   This is a 64 yr old female with PMH of CAD on aspirin, STEMI-s/p TIA to midLAD on 9/15/2022, HTN, HLD, Diabetes-Hemoglobin A1C 6.7 on Metformin and Glipizide , GERD who presents with intra-abdominal and pelvic swelling mass and lump. Pt is scheduled for robotic assisted bilateral salpingo-oophorectomy with dilation and curettage, possible staging on 2/6/2025.  CLARISSE stop bang score 2. Pt denies history of CLARISSE, never did sleep study.

## 2025-01-28 NOTE — H&P PST ADULT - PROBLEM SELECTOR PLAN 6
Instructed to continue Omeprazole and take it with sips of water on day of surgery. Follow-up with provider for management.

## 2025-01-28 NOTE — H&P PST ADULT - PROBLEM SELECTOR PLAN 1
Pt is scheduled for robotic assisted bilateral salpingo-oophorectomy with dilation and curettage, possible staging on 2/6/2025.  CLARISSE stop bang score 2. Pt denies history of CLARISSE, never did sleep study.

## 2025-01-29 PROCEDURE — G0463: CPT

## 2025-01-29 PROCEDURE — 86901 BLOOD TYPING SEROLOGIC RH(D): CPT

## 2025-01-29 PROCEDURE — 86850 RBC ANTIBODY SCREEN: CPT

## 2025-01-29 PROCEDURE — 86900 BLOOD TYPING SEROLOGIC ABO: CPT

## 2025-01-29 PROCEDURE — 36415 COLL VENOUS BLD VENIPUNCTURE: CPT

## 2025-02-05 RX ORDER — NEOMYCIN SULFATE 500 MG/1
500 TABLET ORAL
Qty: 2 | Refills: 0 | Status: ACTIVE | COMMUNITY
Start: 2025-02-05 | End: 1900-01-01

## 2025-02-05 RX ORDER — METRONIDAZOLE 500 MG/1
500 TABLET ORAL
Qty: 2 | Refills: 0 | Status: ACTIVE | COMMUNITY
Start: 2025-02-05 | End: 1900-01-01

## 2025-02-06 ENCOUNTER — RESULT REVIEW (OUTPATIENT)
Age: 65
End: 2025-02-06

## 2025-02-06 ENCOUNTER — TRANSCRIPTION ENCOUNTER (OUTPATIENT)
Age: 65
End: 2025-02-06

## 2025-02-06 ENCOUNTER — OUTPATIENT (OUTPATIENT)
Dept: OUTPATIENT SERVICES | Facility: HOSPITAL | Age: 65
LOS: 1 days | End: 2025-02-06
Payer: MEDICAID

## 2025-02-06 ENCOUNTER — APPOINTMENT (OUTPATIENT)
Dept: GYNECOLOGIC ONCOLOGY | Facility: HOSPITAL | Age: 65
End: 2025-02-06

## 2025-02-06 VITALS
OXYGEN SATURATION: 97 % | TEMPERATURE: 98 F | WEIGHT: 134.92 LBS | HEART RATE: 93 BPM | RESPIRATION RATE: 16 BRPM | SYSTOLIC BLOOD PRESSURE: 107 MMHG | HEIGHT: 61 IN | DIASTOLIC BLOOD PRESSURE: 77 MMHG

## 2025-02-06 VITALS
DIASTOLIC BLOOD PRESSURE: 76 MMHG | HEART RATE: 92 BPM | TEMPERATURE: 98 F | SYSTOLIC BLOOD PRESSURE: 120 MMHG | OXYGEN SATURATION: 95 % | RESPIRATION RATE: 16 BRPM

## 2025-02-06 DIAGNOSIS — Z90.49 ACQUIRED ABSENCE OF OTHER SPECIFIED PARTS OF DIGESTIVE TRACT: Chronic | ICD-10-CM

## 2025-02-06 DIAGNOSIS — R19.00 INTRA-ABDOMINAL AND PELVIC SWELLING, MASS AND LUMP, UNSPECIFIED SITE: ICD-10-CM

## 2025-02-06 DIAGNOSIS — Z98.890 OTHER SPECIFIED POSTPROCEDURAL STATES: Chronic | ICD-10-CM

## 2025-02-06 DIAGNOSIS — Z95.5 PRESENCE OF CORONARY ANGIOPLASTY IMPLANT AND GRAFT: Chronic | ICD-10-CM

## 2025-02-06 LAB
ANION GAP SERPL CALC-SCNC: 6 MMOL/L — SIGNIFICANT CHANGE UP (ref 5–17)
BASOPHILS # BLD AUTO: 0.04 K/UL — SIGNIFICANT CHANGE UP (ref 0–0.2)
BASOPHILS NFR BLD AUTO: 0.3 % — SIGNIFICANT CHANGE UP (ref 0–2)
BLD GP AB SCN SERPL QL: SIGNIFICANT CHANGE UP
BUN SERPL-MCNC: 11 MG/DL — SIGNIFICANT CHANGE UP (ref 7–18)
CALCIUM SERPL-MCNC: 9.7 MG/DL — SIGNIFICANT CHANGE UP (ref 8.4–10.5)
CHLORIDE SERPL-SCNC: 106 MMOL/L — SIGNIFICANT CHANGE UP (ref 96–108)
CO2 SERPL-SCNC: 24 MMOL/L — SIGNIFICANT CHANGE UP (ref 22–31)
CREAT SERPL-MCNC: 0.7 MG/DL — SIGNIFICANT CHANGE UP (ref 0.5–1.3)
EGFR: 98 ML/MIN/1.73M2 — SIGNIFICANT CHANGE UP
EOSINOPHIL # BLD AUTO: 0.01 K/UL — SIGNIFICANT CHANGE UP (ref 0–0.5)
EOSINOPHIL NFR BLD AUTO: 0.1 % — SIGNIFICANT CHANGE UP (ref 0–6)
GLUCOSE BLDC GLUCOMTR-MCNC: 149 MG/DL — HIGH (ref 70–99)
GLUCOSE BLDC GLUCOMTR-MCNC: 190 MG/DL — HIGH (ref 70–99)
GLUCOSE BLDC GLUCOMTR-MCNC: 211 MG/DL — HIGH (ref 70–99)
GLUCOSE BLDC GLUCOMTR-MCNC: 220 MG/DL — HIGH (ref 70–99)
GLUCOSE SERPL-MCNC: 224 MG/DL — HIGH (ref 70–99)
HCT VFR BLD CALC: 40.8 % — SIGNIFICANT CHANGE UP (ref 34.5–45)
HGB BLD-MCNC: 13.4 G/DL — SIGNIFICANT CHANGE UP (ref 11.5–15.5)
IMM GRANULOCYTES NFR BLD AUTO: 0.4 % — SIGNIFICANT CHANGE UP (ref 0–0.9)
LYMPHOCYTES # BLD AUTO: 1.44 K/UL — SIGNIFICANT CHANGE UP (ref 1–3.3)
LYMPHOCYTES # BLD AUTO: 9.2 % — LOW (ref 13–44)
MCHC RBC-ENTMCNC: 28.5 PG — SIGNIFICANT CHANGE UP (ref 27–34)
MCHC RBC-ENTMCNC: 32.8 G/DL — SIGNIFICANT CHANGE UP (ref 32–36)
MCV RBC AUTO: 86.6 FL — SIGNIFICANT CHANGE UP (ref 80–100)
MONOCYTES # BLD AUTO: 0.29 K/UL — SIGNIFICANT CHANGE UP (ref 0–0.9)
MONOCYTES NFR BLD AUTO: 1.8 % — LOW (ref 2–14)
NEUTROPHILS # BLD AUTO: 13.85 K/UL — HIGH (ref 1.8–7.4)
NEUTROPHILS NFR BLD AUTO: 88.2 % — HIGH (ref 43–77)
NRBC # BLD: 0 /100 WBCS — SIGNIFICANT CHANGE UP (ref 0–0)
NRBC BLD-RTO: 0 /100 WBCS — SIGNIFICANT CHANGE UP (ref 0–0)
PLATELET # BLD AUTO: 271 K/UL — SIGNIFICANT CHANGE UP (ref 150–400)
POTASSIUM SERPL-MCNC: 4 MMOL/L — SIGNIFICANT CHANGE UP (ref 3.5–5.3)
POTASSIUM SERPL-SCNC: 4 MMOL/L — SIGNIFICANT CHANGE UP (ref 3.5–5.3)
RBC # BLD: 4.71 M/UL — SIGNIFICANT CHANGE UP (ref 3.8–5.2)
RBC # FLD: 12.5 % — SIGNIFICANT CHANGE UP (ref 10.3–14.5)
SODIUM SERPL-SCNC: 136 MMOL/L — SIGNIFICANT CHANGE UP (ref 135–145)
WBC # BLD: 15.7 K/UL — HIGH (ref 3.8–10.5)
WBC # FLD AUTO: 15.7 K/UL — HIGH (ref 3.8–10.5)

## 2025-02-06 PROCEDURE — 88305 TISSUE EXAM BY PATHOLOGIST: CPT

## 2025-02-06 PROCEDURE — 88307 TISSUE EXAM BY PATHOLOGIST: CPT | Mod: 26

## 2025-02-06 PROCEDURE — 88331 PATH CONSLTJ SURG 1 BLK 1SPC: CPT

## 2025-02-06 PROCEDURE — C1889: CPT

## 2025-02-06 PROCEDURE — 58661 LAPAROSCOPY REMOVE ADNEXA: CPT

## 2025-02-06 PROCEDURE — 58720 REMOVAL OF OVARY/TUBE(S): CPT | Mod: 22

## 2025-02-06 PROCEDURE — S2900: CPT

## 2025-02-06 PROCEDURE — 58120 DILATION AND CURETTAGE: CPT

## 2025-02-06 PROCEDURE — 82962 GLUCOSE BLOOD TEST: CPT

## 2025-02-06 PROCEDURE — 88307 TISSUE EXAM BY PATHOLOGIST: CPT

## 2025-02-06 PROCEDURE — 50715 RELEASE OF URETER: CPT | Mod: 50,59

## 2025-02-06 PROCEDURE — 80048 BASIC METABOLIC PNL TOTAL CA: CPT

## 2025-02-06 PROCEDURE — 88112 CYTOPATH CELL ENHANCE TECH: CPT | Mod: 26

## 2025-02-06 PROCEDURE — 88331 PATH CONSLTJ SURG 1 BLK 1SPC: CPT | Mod: 26

## 2025-02-06 PROCEDURE — 86900 BLOOD TYPING SEROLOGIC ABO: CPT

## 2025-02-06 PROCEDURE — 58661 LAPAROSCOPY REMOVE ADNEXA: CPT | Mod: 50

## 2025-02-06 PROCEDURE — 86850 RBC ANTIBODY SCREEN: CPT

## 2025-02-06 PROCEDURE — 88305 TISSUE EXAM BY PATHOLOGIST: CPT | Mod: 26

## 2025-02-06 PROCEDURE — 64488 TAP BLOCK BI INJECTION: CPT | Mod: XS

## 2025-02-06 PROCEDURE — S2900 ROBOTIC SURGICAL SYSTEM: CPT | Mod: NC

## 2025-02-06 PROCEDURE — 86901 BLOOD TYPING SEROLOGIC RH(D): CPT

## 2025-02-06 PROCEDURE — 58720 REMOVAL OF OVARY/TUBE(S): CPT | Mod: AS

## 2025-02-06 PROCEDURE — 36415 COLL VENOUS BLD VENIPUNCTURE: CPT

## 2025-02-06 PROCEDURE — 85025 COMPLETE CBC W/AUTO DIFF WBC: CPT

## 2025-02-06 PROCEDURE — 86923 COMPATIBILITY TEST ELECTRIC: CPT

## 2025-02-06 PROCEDURE — 88112 CYTOPATH CELL ENHANCE TECH: CPT

## 2025-02-06 PROCEDURE — 52000 CYSTOURETHROSCOPY: CPT | Mod: 59

## 2025-02-06 DEVICE — VISTASEAL FIBRIN HUMAN 10ML: Type: IMPLANTABLE DEVICE | Status: FUNCTIONAL

## 2025-02-06 RX ORDER — METOPROLOL SUCCINATE 25 MG
1 TABLET, EXTENDED RELEASE 24 HR ORAL
Refills: 0 | DISCHARGE

## 2025-02-06 RX ORDER — OMEPRAZOLE 20 MG/1
1 CAPSULE, DELAYED RELEASE ORAL
Refills: 0 | DISCHARGE

## 2025-02-06 RX ORDER — SODIUM CHLORIDE 9 G/ML
1000 INJECTION, SOLUTION INTRAVENOUS
Refills: 0 | Status: DISCONTINUED | OUTPATIENT
Start: 2025-02-06 | End: 2025-02-06

## 2025-02-06 RX ORDER — ONDANSETRON 4 MG/1
4 TABLET, ORALLY DISINTEGRATING ORAL ONCE
Refills: 0 | Status: DISCONTINUED | OUTPATIENT
Start: 2025-02-06 | End: 2025-02-06

## 2025-02-06 RX ORDER — ASPIRIN 81 MG/1
1 TABLET, COATED ORAL
Refills: 0 | DISCHARGE

## 2025-02-06 RX ORDER — ACETAMINOPHEN 160 MG/5ML
1000 SUSPENSION ORAL ONCE
Refills: 0 | Status: COMPLETED | OUTPATIENT
Start: 2025-02-06 | End: 2025-02-06

## 2025-02-06 RX ORDER — IBUPROFEN 600 MG/1
1 TABLET, FILM COATED ORAL
Qty: 0 | Refills: 0 | DISCHARGE

## 2025-02-06 RX ORDER — ACETAMINOPHEN 160 MG/5ML
3 SUSPENSION ORAL
Qty: 0 | Refills: 0 | DISCHARGE

## 2025-02-06 RX ORDER — FENTANYL CITRATE 50 UG/ML
50 INJECTION INTRAMUSCULAR; INTRAVENOUS
Refills: 0 | Status: DISCONTINUED | OUTPATIENT
Start: 2025-02-06 | End: 2025-02-06

## 2025-02-06 RX ORDER — BACTERIOSTATIC SODIUM CHLORIDE 0.9 %
3 VIAL (ML) INJECTION EVERY 8 HOURS
Refills: 0 | Status: DISCONTINUED | OUTPATIENT
Start: 2025-02-06 | End: 2025-02-06

## 2025-02-06 RX ORDER — AMLODIPINE BESYLATE 5 MG
1 TABLET ORAL
Refills: 0 | DISCHARGE

## 2025-02-06 RX ORDER — OXYCODONE HYDROCHLORIDE 30 MG/1
1 TABLET ORAL
Qty: 8 | Refills: 0
Start: 2025-02-06

## 2025-02-06 RX ORDER — FENTANYL CITRATE 50 UG/ML
25 INJECTION INTRAMUSCULAR; INTRAVENOUS
Refills: 0 | Status: DISCONTINUED | OUTPATIENT
Start: 2025-02-06 | End: 2025-02-06

## 2025-02-06 RX ADMIN — FENTANYL CITRATE 25 MICROGRAM(S): 50 INJECTION INTRAMUSCULAR; INTRAVENOUS at 15:30

## 2025-02-06 RX ADMIN — ACETAMINOPHEN 400 MILLIGRAM(S): 160 SUSPENSION ORAL at 15:12

## 2025-02-06 RX ADMIN — FENTANYL CITRATE 25 MICROGRAM(S): 50 INJECTION INTRAMUSCULAR; INTRAVENOUS at 15:45

## 2025-02-06 RX ADMIN — ACETAMINOPHEN 1000 MILLIGRAM(S): 160 SUSPENSION ORAL at 15:27

## 2025-02-06 NOTE — ASU DISCHARGE PLAN (ADULT/PEDIATRIC) - NS DC INTERPRETER YES NO
ED TO INPATIENT ADMISSION HANDOFF:  *Please review chart for additional information*    Chief Complaint   Patient presents with    Wound Check        Diagnosis: Acute left ankle pain  (primary encounter diagnosis)     Code Status:   Code Status: Prior     ALLERGIES:   Allergen Reactions    Penicillins SWELLING     welts      Ethanol VOMITING    Sulfa Antibiotics HIVES    Erythromycin Nausea & Vomiting        Orientation Status & Affect:  Oriented Status: Yes, patient is A&O x4  Affect: calm & cooperative    Ambulation: Ambulates with assistance    ELIMINATION HABITS: Bathroom    TELEMETRY: No    Lines, Tubes, Drains: (PTA or exchanged?):   PIV Access: Left Antecubital  [place]: N/A  Central Venous Access: N/A    Abnormal/outstanding labs:   Labs Reviewed   COMPREHENSIVE METABOLIC PANEL - Abnormal; Notable for the following components:       Result Value    Glucose 145 (*)     BUN 32 (*)     BUN/Cr 30 (*)     GOT/AST 43 (*)     GPT/ALT 94 (*)     Albumin 3.1 (*)     A/G Ratio 0.9 (*)     All other components within normal limits   C REACTIVE PROTEIN - Abnormal; Notable for the following components:    C-Reactive Protein 1.5 (*)     All other components within normal limits   CBC WITH AUTOMATED DIFFERENTIAL (PERFORMABLE ONLY) - Abnormal; Notable for the following components:    WBC 16.9 (*)     RBC 4.28 (*)     MCHC 31.7 (*)     RDW-SD 51.5 (*)     Absolute Neutrophils 15.2 (*)     Absolute Lymphocytes 0.5 (*)     All other components within normal limits   SEDIMENTATION RATE - Normal   PROCALCITONIN - Normal   CBC WITH DIFFERENTIAL    Narrative:     The following orders were created for panel order CBC with Automated Differential.  Procedure                               Abnormality         Status                     ---------                               -----------         ------                     CBC with Automated Dif...[[id number]]  Abnormal            Final result                 Please view results for these  tests on the individual orders.   RAINBOW DRAW    Narrative:     The following orders were created for panel order [place] Draw Light Blue Top Collected? 1 Label; Red Top Collected? No Labels; Light Green Top Collected? 1 Label; Lavender Top Collected? 1 Label; Gold Top Collected? 1 Label; Pink Top Collected? No Labels; Grey Top Collected? No Labels.  Procedure                               Abnormality         Status                     ---------                               -----------         ------                     Light Blue Top[[id number]]                                 In process                 Light Green Top[[id number]]                                In process                 Lavender Top[[id number]]                                   In process                 Gold Top[[id number]]                                       In process                   Please view results for these tests on the individual orders.   LIGHT BLUE TOP   LIGHT GREEN TOP   LAVENDER TOP   GOLD TOP   BLOOD CULTURE   BLOOD CULTURE        Consults (and if contacted):   IP Consult Orders (From admission, onward)       Start     Ordered    11/03/23 2126  Inpatient consult to Orthopedic Surgery  ONE TIME        Provider:  Vicente Beck MD    11/03/23 2125 11/03/23 2125  Inpatient consult to Plastic Surgery  ONE TIME        Provider:  Harish Hernandez MD    11/03/23 2125                     Living Situation: With family and/or spouse    Other:  Preferred Language: English  Pronoun: he/him/his/himself  Safety: fall precaution  Petition & Certification: N/A  Limb Alert: N/A   Wounds: Yes - Other - Specify     Please call ED RN for additional questions, clarification, or sensitive information: Tatiana Aguero RN,     No

## 2025-02-06 NOTE — ASU DISCHARGE PLAN (ADULT/PEDIATRIC) - FINANCIAL ASSISTANCE
[facility] provides services at a reduced cost to those who are determined to be eligible through [facility]’s financial assistance program. Information regarding [facility]’s financial assistance program can be found by going to [email or web address] or by calling [phone].

## 2025-02-06 NOTE — ASU DISCHARGE PLAN (ADULT/PEDIATRIC) - CARE PROVIDER_API CALL
Brandee Shen  Gynecologic Oncology  [address] [place] [id number]  Phone: [phone]  Fax: [phone]  Follow Up Time: 2 weeks

## 2025-02-06 NOTE — CHART NOTE - NSCHARTNOTEFT_GEN_A_CORE
Patient seen at bedside, resting comfortably offers no new complaints. Due to ambulate and void , tolerating clear diet at this time. Denies HA, CP, SOB, N/V/D, dizziness, weakness, palpitations, worsening abdominal pain, worsening vaginal bleeding.    Vital Signs Last 24 Hrs  T(C): 36.1 (06 Feb 2025 14:38), Max: 36.6 (06 Feb 2025 08:13)  T(F): 97 (06 Feb 2025 14:38), Max: 97.9 (06 Feb 2025 08:13)  HR: 94 (06 Feb 2025 15:00) (79 - 94)  BP: 110/76 (06 Feb 2025 15:00) (107/77 - 129/93)  BP(mean): 88 (06 Feb 2025 15:00) (88 - 105)  RR: 12 (06 Feb 2025 15:00) (12 - 20)  SpO2: 98% (06 Feb 2025 15:00) (95% - 99%)    Parameters below as of 06 Feb 2025 15:00  Patient On (Oxygen Delivery Method): room air        Gen: A&O x 3, NAD  Abdomen: +BS; soft; nontender, nondistended; Port dressings C/D/I  Gyn: No vaginal bleeding   Ext: Nontender, no worsening edema, no calf tenderness  Pyle is clear                           13.4   15.70 )-----------( 271      ( 06 Feb 2025 14:30 )             40.8         A/P: 63 yo POD #0 s/p RA bilateral salpingo-oohorectomy, dilation and curettage, pt stable       -Pain management as needed  -DVT ppx: OOB and ambulate  -Vitals and labs reviewed  -Incentive spirometer encouraged   -Pt cleared for discharge after ambulation, void,  and PO intake    -d/w Dr. Rene Yu

## 2025-02-06 NOTE — ASU PATIENT PROFILE, ADULT - NSICDXPASTMEDICALHX_GEN_ALL_CORE_FT
PAST MEDICAL HISTORY:  CAD (coronary artery disease)     DM (diabetes mellitus)     GERD (gastroesophageal reflux disease)     HTN (hypertension)     Hyperlipidemia

## 2025-02-06 NOTE — ASU DISCHARGE PLAN (ADULT/PEDIATRIC) - CALL YOUR DOCTOR IF YOU HAVE ANY OF THE FOLLOWING:
Bleeding that does not stop/Fever greater than (need to indicate Fahrenheit or Celsius)/Wound/Surgical Site with redness, or foul smelling discharge or pus Bleeding that does not stop/Pain not relieved by Medications/Fever greater than (need to indicate Fahrenheit or Celsius)/Wound/Surgical Site with redness, or foul smelling discharge or pus

## 2025-02-06 NOTE — PROVIDER CONTACT NOTE (CHANGE IN STATUS NOTIFICATION) - BACKGROUND
EXAMINATION UNDER ANESTHESIA, ROBOTIC ASSISTED LAPAROSCOPIC BILATERAL SALPINGOOOPHORECTOMY, LYSIS OF ADHESIONS, BILATERAL URETERALYSIS, DILATATION AND CURRETTAGE, CYSTOSCOPY AND BILATERAL TRANSVERSUS ABDOMINIS PLANE BLOCK
EXAMINATION UNDER ANESTHESIA, ROBOTIC ASSISTED LAPAROSCOPIC BILATERAL SALPINGOOOPHORECTOMY, LYSIS OF ADHESIONS, BILATERAL URETERALYSIS, DILATATION AND CURRETTAGE, CYSTOSCOPY AND BILATERAL TRANSVERSUS ABDOMINIS PLANE BLOCK

## 2025-02-06 NOTE — BRIEF OPERATIVE NOTE - OPERATION/FINDINGS
frozen section:  benign ovarian cyst.   For full operative description, see surgeons full operative report.

## 2025-02-06 NOTE — PROVIDER CONTACT NOTE (CHANGE IN STATUS NOTIFICATION) - ASSESSMENT
vss,no active bleeding,asymptomatic ,alert verbal vss,no active bleeding,asymptomatic ,alert verbal but lethargic vss,no active bleeding,asymptomatic , verbal but lethargic

## 2025-02-06 NOTE — ASU PATIENT PROFILE, ADULT - DOES PATIENT HAVE ADVANCE DIRECTIVE
Pt's son Yonny Hidalgosundar [phone] and friend Lainey Mcintyre [phone] will make decisions in case of emergency/No

## 2025-02-06 NOTE — BRIEF OPERATIVE NOTE - NSICDXBRIEFPROCEDURE_GEN_ALL_CORE_FT
PROCEDURES:  Salpingo-oophorectomy, bilateral, robot-assisted, using da Kamlesh Xi 06-Feb-2025 12:38:08  Cheri Boss  Dilation and curettage, uterus 06-Feb-2025 12:38:17  Cheri Boss  Cystoscopy, female 06-Feb-2025 12:38:38  Cheri Boss  Block, transversus abdominis plane, bilateral 06-Feb-2025 12:38:51  Cheri Boss

## 2025-02-06 NOTE — PROVIDER CONTACT NOTE (CHANGE IN STATUS NOTIFICATION) - RECOMMENDATIONS
as per Dr Daniel finger stick prior to discharge as per Dr Daniel  repeat finger stick prior to discharge as per Dr Daniel  repeat finger stick prior to discharge,no intervention,continue monitoring

## 2025-02-07 ENCOUNTER — NON-APPOINTMENT (OUTPATIENT)
Age: 65
End: 2025-02-07

## 2025-02-10 ENCOUNTER — NON-APPOINTMENT (OUTPATIENT)
Age: 65
End: 2025-02-10

## 2025-02-10 LAB — NON-GYNECOLOGICAL CYTOLOGY STUDY: SIGNIFICANT CHANGE UP

## 2025-02-12 LAB — SURGICAL PATHOLOGY STUDY: SIGNIFICANT CHANGE UP

## 2025-02-28 ENCOUNTER — APPOINTMENT (OUTPATIENT)
Dept: GYNECOLOGIC ONCOLOGY | Facility: CLINIC | Age: 65
End: 2025-02-28
Payer: MEDICAID

## 2025-02-28 ENCOUNTER — NON-APPOINTMENT (OUTPATIENT)
Age: 65
End: 2025-02-28

## 2025-02-28 VITALS
DIASTOLIC BLOOD PRESSURE: 71 MMHG | HEART RATE: 83 BPM | OXYGEN SATURATION: 96 % | HEIGHT: 62 IN | BODY MASS INDEX: 24.66 KG/M2 | SYSTOLIC BLOOD PRESSURE: 105 MMHG | WEIGHT: 134 LBS

## 2025-02-28 PROCEDURE — 99459 PELVIC EXAMINATION: CPT

## 2025-02-28 PROCEDURE — 99212 OFFICE O/P EST SF 10 MIN: CPT | Mod: 24

## (undated) DEVICE — D HELP - CLEARVIEW CLEARIFY SYSTEM

## (undated) DEVICE — DRAPE LEGGINGS 6" CUFF 28X43"

## (undated) DEVICE — POSITIONER PINK PAD PIGAZZI SYSTEM

## (undated) DEVICE — XI VESSEL SEALER

## (undated) DEVICE — DRAPE ROBOTIC

## (undated) DEVICE — TUBING AIRSEAL TRI-LUMEN FILTERED

## (undated) DEVICE — UTERINE MANIPULATOR COOPER SURGICAL 5MM 33CM GREEN

## (undated) DEVICE — ELCTR GROUNDING PAD ADULT COVIDIEN

## (undated) DEVICE — POSITIONER FOAM EGG CRATE ULNAR 2PCS (PINK)

## (undated) DEVICE — XI DRAPE ARM

## (undated) DEVICE — DRSG DERMABOND 0.7ML

## (undated) DEVICE — GOWN LG

## (undated) DEVICE — SUT MONOCRYL 4-0 27" PS-2 UNDYED

## (undated) DEVICE — XI DRAPE COLUMN

## (undated) DEVICE — GLV 6.5 PROTEXIS (WHITE)

## (undated) DEVICE — APPLICATOR VISTASEAL LAP DUAL 35CM RIGID

## (undated) DEVICE — TUBING STRYKEFLOW II SUCTION / IRRIGATOR

## (undated) DEVICE — SYR LUER LOK 50CC

## (undated) DEVICE — LIGASURE BLUNT TIP 5MM X 37CM

## (undated) DEVICE — XI SEAL UNIVERSIAL 5-12MM

## (undated) DEVICE — PACK PERI GYN

## (undated) DEVICE — PACK LITHOTOMY

## (undated) DEVICE — PACK ROBOTIC

## (undated) DEVICE — TROCAR SURGIQUEST AIRSEAL 5MM X 100MM

## (undated) DEVICE — UTERINE MANIPULATOR CONMED VCARE SM 32MM

## (undated) DEVICE — NDL HYPO SAFE 22G X 1.5" (BLACK)

## (undated) DEVICE — FOLEY TRAY 16FR 5CC LF UMETER CLOSED

## (undated) DEVICE — SUT VICRYL 0 27" UR-6

## (undated) DEVICE — SYR LUER LOK 20CC

## (undated) DEVICE — SOL IRR POUR NS 0.9% 1500ML

## (undated) DEVICE — SOL IRR SORBITOL 3% 3000ML

## (undated) DEVICE — WARMING BLANKET UPPER ADULT

## (undated) DEVICE — URETERAL CATH RED RUBBER 16FR (ORANGE)

## (undated) DEVICE — GLV 6.5 PROTEXIS (BLUE)

## (undated) DEVICE — XI ENDOWRIST SUCTION IRRIGATOR 8MM

## (undated) DEVICE — SOL IRR POUR H2O 500ML

## (undated) DEVICE — XI SCISSOR TIP COVER

## (undated) DEVICE — ELCTR STRYKER NEPTUNE SMOKE EVACUATION PENCIL (GREEN)

## (undated) DEVICE — SUT VICRYL PLUS 0 54" TIES UNDYED

## (undated) DEVICE — PREP BETADINE KIT

## (undated) DEVICE — VENODYNE/SCD SLEEVE CALF MEDIUM

## (undated) DEVICE — TUBING TUR 2 PRONG

## (undated) DEVICE — SOL IRR POUR NS 0.9% 1000ML